# Patient Record
Sex: MALE | Race: OTHER | HISPANIC OR LATINO | URBAN - METROPOLITAN AREA
[De-identification: names, ages, dates, MRNs, and addresses within clinical notes are randomized per-mention and may not be internally consistent; named-entity substitution may affect disease eponyms.]

---

## 2022-09-17 ENCOUNTER — INPATIENT (INPATIENT)
Facility: HOSPITAL | Age: 22
LOS: 3 days | Discharge: HOME | End: 2022-09-21
Attending: STUDENT IN AN ORGANIZED HEALTH CARE EDUCATION/TRAINING PROGRAM | Admitting: STUDENT IN AN ORGANIZED HEALTH CARE EDUCATION/TRAINING PROGRAM

## 2022-09-17 VITALS
TEMPERATURE: 97 F | RESPIRATION RATE: 16 BRPM | HEART RATE: 89 BPM | SYSTOLIC BLOOD PRESSURE: 139 MMHG | DIASTOLIC BLOOD PRESSURE: 82 MMHG | OXYGEN SATURATION: 99 %

## 2022-09-17 PROCEDURE — 99285 EMERGENCY DEPT VISIT HI MDM: CPT

## 2022-09-17 RX ORDER — SODIUM CHLORIDE 9 MG/ML
1000 INJECTION, SOLUTION INTRAVENOUS ONCE
Refills: 0 | Status: COMPLETED | OUTPATIENT
Start: 2022-09-17 | End: 2022-09-17

## 2022-09-17 RX ORDER — ONDANSETRON 8 MG/1
4 TABLET, FILM COATED ORAL ONCE
Refills: 0 | Status: COMPLETED | OUTPATIENT
Start: 2022-09-17 | End: 2022-09-17

## 2022-09-17 RX ADMIN — ONDANSETRON 4 MILLIGRAM(S): 8 TABLET, FILM COATED ORAL at 23:56

## 2022-09-17 RX ADMIN — SODIUM CHLORIDE 1000 MILLILITER(S): 9 INJECTION, SOLUTION INTRAVENOUS at 23:56

## 2022-09-17 NOTE — ED PROVIDER NOTE - CLINICAL SUMMARY MEDICAL DECISION MAKING FREE TEXT BOX
22-year-old man presents here for an episode of seizures.  Patient states he was laying in bed and woke up suddenly had a tonic-clonic seizure lasted 5 to 10 minutes associated with urinary incontinence no tongue biting was postictal for about 20 minutes never had this before endorses drinking alcohol 1-2 times a week endorses nausea vomiting and epigastric pain after nausea vomiting no chest pain shortness of breath numbness tingling neck pain  vs reviewed labs imaging ekg obtained and reviewed, neuro consulted, recommended admission to [facility] for eeg monitoring.

## 2022-09-17 NOTE — ED ADULT TRIAGE NOTE - CHIEF COMPLAINT QUOTE
Biba due to brother found shaking on ground for unknown time. Pt is awake and alert in triage.  on scene.

## 2022-09-17 NOTE — ED PROVIDER NOTE - NS ED ROS FT
CONSTITUTIONAL - No acute distress , No weight change  SKIN - No rash  HEMATOLOGIC - No abnormal bleeding or bruising  EYES - , No conjunctival injection, No drainage   ENT -, No sore throat, No neck pain, No rhinorrhea, No ear pain  RESPIRATORY - No shortness of breath, No cough  CARDIAC -No chest pain, No palpitations  GI - No abdominal pain, + nausea, vomiting, No diarrhea, No constipation, No bright red blood per rectum or melena. No flank pain  - No dysuria, frequency, hematuria  MUSCULOSKELETAL - No joint paint, No swelling, No back pain  NEUROLOGIC - No numbness, No focal weakness, + headache, No dizziness, +seizure  ALLERGIC- no pruritis

## 2022-09-17 NOTE — ED PROVIDER NOTE - ATTENDING CONTRIBUTION TO CARE
22-year-old man presents here for an episode of seizures.  Patient states he was laying in bed and woke up suddenly had a tonic-clonic seizure lasted 5 to 10 minutes associated with urinary incontinence no tongue biting was postictal for about 20 minutes never had this before endorses drinking alcohol 1-2 times a week endorses nausea vomiting and epigastric pain after nausea vomiting no chest pain shortness of breath numbness tingling neck pain   CONSTITUTIONAL: WA / WN / NAD  HEAD: NCAT  EYES: PERRL; EOMI;   ENT: Normal pharynx; mucous membranes pink/moist, no erythema.  NECK: Supple; no meningeal signs  CARD: RRR; nl S1/S2; no M/R/G. P  RESP: Respiratory rate and effort are normal; breath sounds clear and equal bilaterally.  ABD: Soft, ND +epigastric ttp  MSK/EXT: No gross deformities; full range of motion.  SKIN: Warm and dry;   NEURO: AAOx3, Motor 5/5 x 4 extremities,   PSYCH: Memory Intact, Normal Affect 22-year-old man presents here for an episode of seizures.  Patient states he was laying in bed and woke up suddenly had a tonic-clonic seizure lasted 5 to 10 minutes associated with urinary incontinence no tongue biting was postictal for about 20 minutes never had this before endorses drinking alcohol 1-2 times a week endorses nausea vomiting and epigastric pain after nausea vomiting no chest pain shortness of breath numbness tingling neck pain   CONSTITUTIONAL: WA / WN / NAD  HEAD: NCAT  EYES: PERRL; EOMI;   ENT: Normal pharynx; mucous membranes pink/moist, no erythema.  NECK: Supple; no meningeal signs  CARD: RRR; nl S1/S2; no M/R/G.   RESP: Respiratory rate and effort are normal; breath sounds clear and equal bilaterally.  ABD: Soft, ND +epigastric ttp  MSK/EXT: No gross deformities; full range of motion.  SKIN: Warm and dry;   NEURO: AAOx3, Motor 5/5 x 4 extremities,   PSYCH: Memory Intact, Normal Affect

## 2022-09-17 NOTE — ED PROVIDER NOTE - PROGRESS NOTE DETAILS
Neurology consulted, will come to evaluate patient shortly; PT says he is feeling well, mild HA but nausea has resolved -CD PT reassessed, stable, resting comfortably, awaiting neuro -CD Neuro contacted again, resident discussing with attending currently, will get back to ED team with dispo -CD PT reassessed, stable, resting comfortably, awaiting neuro; neuro contacted again and awaiting attending response -CD

## 2022-09-17 NOTE — ED PROVIDER NOTE - OBJECTIVE STATEMENT
PT is a 22M with no PMH aside from HT 16 years ago with +skull fracture, no neurointervention p/w first time seizure. Around 1030PM tonight, PT was in bed, brother nearby, when PT began shaking with generalized movements, eyes rolled in the back of his head, +incontinence. Episode lasted about 5-15 minutes, unsure of time frame, before breaking on its own. Afterwards, PT confused for about 20 minutes. +nausea with one episode of NBNB vomiting. +mild HA. PT was well leading up to incidence, has never suffered from a seizure before. Denies fever, is otherwise well. PT is a 22M with no PMH aside from HT 16 years ago with +skull fracture, no neurointervention p/w first time seizure. Around 1030PM tonight, PT was in bed, brother nearby, when PT began shaking with generalized movements, eyes rolled in the back of his head, +incontinence. Episode lasted about 5-15 minutes, unsure of time frame, before breaking on its own. Afterwards, PT confused for about 20 minutes. +nausea with one episode of NBNB vomiting. +mild HA. PT was well leading up to incidence, has never suffered from a seizure before. Denies fever, is otherwise well. Denies drug or alcohol use, recent HT

## 2022-09-17 NOTE — ED PROVIDER NOTE - PHYSICAL EXAMINATION
CONSTITUTIONAL: Well-developed; well-nourished; NAD  SKIN: warm, dry, w/o rash  HEAD: NCAT  EYES: PERRLA, EOMI, no conjunctival injection  ENT: No nasal discharge; nl OP without erythema or exudates  NECK: Supple, non-tender  CARD: nl S1, S2; RRR, no MRG, no JVD  RESP: CTAB, normal respiratory effort  ABD: BS+, soft, NTND, no HSM  EXT: Normal ROM.  No clubbing, cyanosis or edema  NEURO: Alert, oriented, grossly unremarkable; CN2-12 intact; nl cerebellar exam; nl sensation and motor throughout  PSYCH: Cooperative, appropriate

## 2022-09-17 NOTE — ED ADULT NURSE NOTE - OBJECTIVE STATEMENT
pt is a 23 yo M BIBA s/p witnessed seizure. pt brother witnessed the seizure and is unsure of the amount of time the pt was seizing.

## 2022-09-18 LAB
ALBUMIN SERPL ELPH-MCNC: 4.8 G/DL — SIGNIFICANT CHANGE UP (ref 3.5–5.2)
ALP SERPL-CCNC: 138 U/L — HIGH (ref 30–115)
ALT FLD-CCNC: 26 U/L — SIGNIFICANT CHANGE UP (ref 0–41)
ANION GAP SERPL CALC-SCNC: 10 MMOL/L — SIGNIFICANT CHANGE UP (ref 7–14)
APPEARANCE UR: CLEAR — SIGNIFICANT CHANGE UP
AST SERPL-CCNC: 22 U/L — SIGNIFICANT CHANGE UP (ref 0–41)
BACTERIA # UR AUTO: ABNORMAL
BASOPHILS # BLD AUTO: 0.06 K/UL — SIGNIFICANT CHANGE UP (ref 0–0.2)
BASOPHILS NFR BLD AUTO: 0.5 % — SIGNIFICANT CHANGE UP (ref 0–1)
BILIRUB SERPL-MCNC: 0.3 MG/DL — SIGNIFICANT CHANGE UP (ref 0.2–1.2)
BILIRUB UR-MCNC: NEGATIVE — SIGNIFICANT CHANGE UP
BUN SERPL-MCNC: 11 MG/DL — SIGNIFICANT CHANGE UP (ref 10–20)
CALCIUM SERPL-MCNC: 9.8 MG/DL — SIGNIFICANT CHANGE UP (ref 8.4–10.5)
CHLORIDE SERPL-SCNC: 96 MMOL/L — LOW (ref 98–110)
CO2 SERPL-SCNC: 25 MMOL/L — SIGNIFICANT CHANGE UP (ref 17–32)
COLOR SPEC: YELLOW — SIGNIFICANT CHANGE UP
CREAT SERPL-MCNC: 0.7 MG/DL — SIGNIFICANT CHANGE UP (ref 0.7–1.5)
DIFF PNL FLD: ABNORMAL
EGFR: 133 ML/MIN/1.73M2 — SIGNIFICANT CHANGE UP
EOSINOPHIL # BLD AUTO: 0.34 K/UL — SIGNIFICANT CHANGE UP (ref 0–0.7)
EOSINOPHIL NFR BLD AUTO: 3 % — SIGNIFICANT CHANGE UP (ref 0–8)
GLUCOSE SERPL-MCNC: 153 MG/DL — HIGH (ref 70–99)
GLUCOSE UR QL: NEGATIVE MG/DL — SIGNIFICANT CHANGE UP
HCT VFR BLD CALC: 47.6 % — SIGNIFICANT CHANGE UP (ref 42–52)
HGB BLD-MCNC: 16.7 G/DL — SIGNIFICANT CHANGE UP (ref 14–18)
IMM GRANULOCYTES NFR BLD AUTO: 0.4 % — HIGH (ref 0.1–0.3)
KETONES UR-MCNC: NEGATIVE — SIGNIFICANT CHANGE UP
LACTATE SERPL-SCNC: 3.1 MMOL/L — HIGH (ref 0.7–2)
LEUKOCYTE ESTERASE UR-ACNC: ABNORMAL
LYMPHOCYTES # BLD AUTO: 3.63 K/UL — HIGH (ref 1.2–3.4)
LYMPHOCYTES # BLD AUTO: 32.4 % — SIGNIFICANT CHANGE UP (ref 20.5–51.1)
MCHC RBC-ENTMCNC: 31.9 PG — HIGH (ref 27–31)
MCHC RBC-ENTMCNC: 35.1 G/DL — SIGNIFICANT CHANGE UP (ref 32–37)
MCV RBC AUTO: 90.8 FL — SIGNIFICANT CHANGE UP (ref 80–94)
MONOCYTES # BLD AUTO: 0.76 K/UL — HIGH (ref 0.1–0.6)
MONOCYTES NFR BLD AUTO: 6.8 % — SIGNIFICANT CHANGE UP (ref 1.7–9.3)
NEUTROPHILS # BLD AUTO: 6.36 K/UL — SIGNIFICANT CHANGE UP (ref 1.4–6.5)
NEUTROPHILS NFR BLD AUTO: 56.9 % — SIGNIFICANT CHANGE UP (ref 42.2–75.2)
NITRITE UR-MCNC: NEGATIVE — SIGNIFICANT CHANGE UP
NRBC # BLD: 0 /100 WBCS — SIGNIFICANT CHANGE UP (ref 0–0)
PH UR: 6.5 — SIGNIFICANT CHANGE UP (ref 5–8)
PLATELET # BLD AUTO: 347 K/UL — SIGNIFICANT CHANGE UP (ref 130–400)
POTASSIUM SERPL-MCNC: 3.9 MMOL/L — SIGNIFICANT CHANGE UP (ref 3.5–5)
POTASSIUM SERPL-SCNC: 3.9 MMOL/L — SIGNIFICANT CHANGE UP (ref 3.5–5)
PROT SERPL-MCNC: 7.2 G/DL — SIGNIFICANT CHANGE UP (ref 6–8)
PROT UR-MCNC: NEGATIVE MG/DL — SIGNIFICANT CHANGE UP
RBC # BLD: 5.24 M/UL — SIGNIFICANT CHANGE UP (ref 4.7–6.1)
RBC # FLD: 12.5 % — SIGNIFICANT CHANGE UP (ref 11.5–14.5)
RBC CASTS # UR COMP ASSIST: SIGNIFICANT CHANGE UP /HPF
SARS-COV-2 RNA SPEC QL NAA+PROBE: SIGNIFICANT CHANGE UP
SODIUM SERPL-SCNC: 131 MMOL/L — LOW (ref 135–146)
SP GR SPEC: 1.02 — SIGNIFICANT CHANGE UP (ref 1.01–1.03)
UROBILINOGEN FLD QL: 0.2 MG/DL — SIGNIFICANT CHANGE UP
WBC # BLD: 11.19 K/UL — HIGH (ref 4.8–10.8)
WBC # FLD AUTO: 11.19 K/UL — HIGH (ref 4.8–10.8)
WBC UR QL: ABNORMAL /HPF

## 2022-09-18 PROCEDURE — 99221 1ST HOSP IP/OBS SF/LOW 40: CPT

## 2022-09-18 PROCEDURE — 70450 CT HEAD/BRAIN W/O DYE: CPT | Mod: 26,MA

## 2022-09-18 PROCEDURE — 99223 1ST HOSP IP/OBS HIGH 75: CPT

## 2022-09-18 PROCEDURE — 93010 ELECTROCARDIOGRAM REPORT: CPT

## 2022-09-18 RX ORDER — IBUPROFEN 200 MG
800 TABLET ORAL ONCE
Refills: 0 | Status: COMPLETED | OUTPATIENT
Start: 2022-09-18 | End: 2022-09-18

## 2022-09-18 RX ORDER — ENOXAPARIN SODIUM 100 MG/ML
40 INJECTION SUBCUTANEOUS EVERY 24 HOURS
Refills: 0 | Status: DISCONTINUED | OUTPATIENT
Start: 2022-09-18 | End: 2022-09-21

## 2022-09-18 RX ORDER — SODIUM CHLORIDE 9 MG/ML
1000 INJECTION, SOLUTION INTRAVENOUS ONCE
Refills: 0 | Status: COMPLETED | OUTPATIENT
Start: 2022-09-18 | End: 2022-09-18

## 2022-09-18 RX ADMIN — SODIUM CHLORIDE 1000 MILLILITER(S): 9 INJECTION, SOLUTION INTRAVENOUS at 01:56

## 2022-09-18 RX ADMIN — Medication 800 MILLIGRAM(S): at 01:02

## 2022-09-18 RX ADMIN — Medication 800 MILLIGRAM(S): at 01:56

## 2022-09-18 NOTE — PATIENT PROFILE ADULT - FALL HARM RISK - HARM RISK INTERVENTIONS

## 2022-09-18 NOTE — CONSULT NOTE ADULT - PROVIDER SPECIALTY LIST ADULT
Cindy Honeycutt is requesting a refill of lidocaine viscous for a 30 day supply and would like sent to local pharmacy, [facility].    Neurology 0 = swallows foods/liquids without difficulty

## 2022-09-18 NOTE — H&P ADULT - ASSESSMENT
New Onset Seizure in a 23yo M w/ PMHx of Head / Skull trauma 16yrs ago being transfered to [facility] for admission to epilepsy unit for further workup and monitoring    Impression  New Onset GTC  Reactive - elevated WBC, Lactic Acid    Neuro recs appreciated:  Get UA, Utox, BAL  Get A1c  Needs Continuous EEG monitoring >> Admit to [facility]  Lorazepam 2mg IVP for Seizure > 2 mins   Seizure / Fall risk Precaution  May Need to start ASM pending EMU admission

## 2022-09-18 NOTE — CONSULT NOTE ADULT - ATTENDING COMMENTS
I have personally seen and examined this patient.  I have fully participated in the care of this patient.  I have reviewed all pertinent clinical information, including history, physical exam, plan and note.  23yo man with history of head trauma many years ago presented with episode of suspected seizure upon awakening and one more episode LOC few months ago. [facility].      I have reviewed all pertinent clinical information and reviewed all relevant imaging and diagnostic studies personally.  Recommendations as above.  Agree with above assessment except as noted. I have personally seen and examined this patient.  I have fully participated in the care of this patient.  I have reviewed all pertinent clinical information, including history, physical exam, plan and note.  21yo man with history of head trauma many years ago presented with episode of suspected seizure upon awakening and one more episode LOC few months ago. CT head showed chronic right frontal infarct rises suspicion for localized related epilepsy. Recommend [facility] for [facility]G.      I have reviewed all pertinent clinical information and reviewed all relevant imaging and diagnostic studies personally.  Recommendations as above.  Agree with above assessment except as noted.

## 2022-09-18 NOTE — H&P ADULT - HISTORY OF PRESENT ILLNESS
22y Male w/ No PMHx aside from Head Trauma 16 years ago with +skull fracture, no neuro-intervention p/w first time seizure. Around 1030PM 09/17 brother noticed Pt shaking in bed. Brother describe him w/ neck hyperextended, eyes rolled back, foaming of the mouth, b/l elbow flexion, extended legs w/ body shaking. Brother expressed that event lasted for what felt like 7 mins. After the event ended he would called his brother name w/out response other than a eye opening then closed. Pt regain consciousness when ambulance arrived. Pt also had post ictal urinary incontinence.  Pt was also nauseated and had one episode of NBNB vomiting and a mild HA.     Pt reported a h/o trying to wake up but unable to 3 months ago which felt similar without the shaking. He denies h/o seizure, recent head trauma, recent illness or drug use. He currently drinks 7-8beers occasionally but had 2 beers yesterday.     Admitting team contacted by ED for stat admission as transport is waiting to take patient to [facility] epilepsy unit     Epilepsy risk factors: Head trauma w/ skull fracture   Head injury with subsequent LOC?: No  Febrile seizures in infancy?: None   Hx of CNS infection?: No   Family hx of epilepsy?: No  Known CNS pathology?: Trauma      Prior AEDs: None    Prior imaging CTH - se below   Prior EEGs - None

## 2022-09-18 NOTE — H&P ADULT - NSHPLABSRESULTS_GEN_ALL_CORE
131<L>  |  96<L>  |  11  ----------------------------<  153<H>  3.9   |  25  |  0.7    Ca    9.8      18 Sep 2022 00:00  Mg     2.0     09-18    TPro  7.2  /  Alb  4.8  /  TBili  0.3  /  DBili  x   /  AST  22  /  ALT  26  /  AlkPhos  138<H>  09-18    LIVER FUNCTIONS - ( 18 Sep 2022 00:00 )  Alb: 4.8 g/dL / Pro: 7.2 g/dL / ALK PHOS: 138 U/L / ALT: 26 U/L / AST: 22 U/L / GGT: x                 EEG:    < from: CT Head No Cont (09.18.22 @ 00:10) >  Gliotic changes are noted in the inferior right frontal lobe, compatible   with chronic infarct. There is associated mild ex vacuo dilatation of the   anterior horn of the right lateral ventricle. There is a comminuted right   frontal calvarial defect measuring up to 1.7 x 1.5 cm, compatible with   craniotomy versus chronic fracture.    There is no intraparenchymal hematoma, mass effect or midline shift. No   abnormal extra-axial fluid collections are present.    The visualized intraorbital compartments, paranasal sinuses and mastoid   complexes appear free of acute disease.    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    No acute intracranial pathology.

## 2022-09-18 NOTE — H&P ADULT - NSHPPHYSICALEXAM_GEN_ALL_CORE
General:  Constitutional:  Sitting comfortably in NAD.  Psychiatric: calm, normal affect, no overt anxiety or internal preoccupation  Ears, Nose, Throat: no abnormalities, mucus membranes moist  Neck: supple, no lymphadenopathy or nodules palpable  Cardiovascular: regular rate and rhythm, normal S1/S2, no murmurs   Chest: Clear to bases. 	  Abdomen: soft, non-tender, no hepatosplenomegaly   Extremities: no edema, clubbing or cyanosis  Skin: no rash or neurocutaneous signs     Cognitive:  Orientation, language, memory and knowledge screens intact.  Cranial Nerves:  II: Full to confrontation. III/IV/VI: PERRL EOMI No nystagmus  V1V2V3: Symmetric, VII: Face appears symmetric VIII: Normal to screening, IX/X: Palate Elevates Symmetrical  XI: Trapezius Symmetric  XII: Tongue midline  Motor:  Power: 5/5 throughout, tone: normal x 4 limbs, no tremor   Sensation:  Intact to light touch. Intact to pinprick/temperature and vibration.  Coordination/Gait:  Finger-nose-finger intact, normal rapid-alternating movements.  Fine motor normal with normal rapid finger taps and heel-toe tapping   narrow based gait, tandem forward and back ok  hops well on both feet, heel and toe walking normal   Reflexes:  DTR: 2+ symmetric all 4 limbs, no clonus  Plantar responses: Down bilaterally

## 2022-09-18 NOTE — CONSULT NOTE ADULT - ASSESSMENT
Neurology Consulted for New Onset Seizure in a 21yo M w/ PMHx of Head / Skull trauma 16yrs ago.     Impression  New Onset GTC  Reactive - elevated WBC, LActic Acid    Recommendations:  Get UA, Utox, BAL  Get A1c  Needs EEG monitoring  Lorazepam 2mg IVP for Seizure > 2 mins   Seizure / Fall risk Precaution  May Need to start ASM    Thank you for the courtesy of this consult, Please contact us if any neurological changes or questions, neurology team will continue to follow.    Discussing w/ Attending Neurology Consulted for New Onset Seizure in a 21yo M w/ PMHx of Head / Skull trauma 16yrs ago.     Impression  New Onset GTC  Reactive - elevated WBC, LActic Acid    Recommendations:  Get UA, Utox, BAL  Get A1c  Needs Continuous EEG monitoring >> Admit to [facility] EMU  Lorazepam 2mg IVP for Seizure > 2 mins   Seizure / Fall risk Precaution  May Need to start ASM pending EMU admission    Thank you for the courtesy of this consult, Please contact us if any neurological changes or questions, neurology team will continue to follow.     Neurology Consulted for New Onset Seizure in a 23yo M w/ PMHx of Head / Skull trauma 16yrs ago.     Impression  New Onset GTC  Reactive - elevated WBC, LActic Acid    Recommendations:  Get UA, Utox, BAL  Get A1c  Needs Continuous EEG monitoring >> Admit to [facility] EMU  Lorazepam 2mg IVP for Seizure > 2 mins   Seizure / Fall risk Precaution    Thank you for the courtesy of this consult, Please contact us if any neurological changes or questions, neurology team will continue to follow.

## 2022-09-18 NOTE — CONSULT NOTE ADULT - SUBJECTIVE AND OBJECTIVE BOX
HPI  22y Male w/ No PMHx aside from Head Trauma 16 years ago with +skull fracture, no neuro-intervention p/w first time seizure. Around 1030PM 09/17 brother noticed Pt shaking in bed. Brother describe him w/ neck hyperextended, eyes rolled back, foaming of the mouth, b/l elbow flexion, extended legs w/ body shaking. Brother expressed that event lasted for what felt like 7 mins. After the event ended he would called his brother name w/out response other than a eye opening then closed. Pt regain consciousness when ambulance arrived. Pt also had post ictal urinary incontinence.  Pt was also nauseated and had one episode of NBNB vomiting and a mild HA.     Pt reported a h/o trying to wake up but unable to 3 months ago which felt similar without the shaking. He denies h/o seizure, recent head trauma, recent illness or durug use. He currently drinks 7-8beers occasionally but had 2 beers yesterday.      Epilepsy risk factors: Head trauma w/ skull fracture   Head injury with subsequent LOC?: No  Febrile seizures in infancy?: None   Hx of CNS infection?: No   Family hx of epilepsy?: No  Known CNS pathology?: Trauma      Prior AEDs: None    Prior imaging CTH - se below   Prior EEGs - None      Other diagnostic work-up     Review of Systems:  CONSTITUTIONAL:  No weight loss, fever, chills, weakness or fatigue.  HEENT:  Eyes:  No visual loss, blurred vision, double vision or yellow sclerae. Ears, Nose, Throat:  No hearing loss, sneezing, congestion, runny nose or sore throat.  SKIN:  No rash or itching.  CARDIOVASCULAR:  No chest pain, chest pressure or chest discomfort. No palpitations or edema.  RESPIRATORY:  No shortness of breath, cough or sputum.  GASTROINTESTINAL:  No anorexia, nausea, vomiting or diarrhea. No abdominal pain or blood.  GENITOURINARY: No Burning on urination.   NEUROLOGICAL:  see HPI  MUSCULOSKELETAL:  No muscle, back pain, joint pain or stiffness.  HEMATOLOGIC:  No anemia, bleeding or bruising.  LYMPHATICS:  No enlarged nodes. No history of splenectomy.  PSYCHIATRIC:  No history of depression or anxiety.  ENDOCRINOLOGIC:  No reports of sweating, cold or heat intolerance. No polyuria or polydipsia.  ALLERGIES:  No history of asthma, hives, eczema or rhinitis.       PAST MEDICAL & SURGICAL HISTORY:  Seizure  No significant past surgical history     FAMILY HISTORY:       Social History:  Alcohol, illicits, smoking?: 7-8 beers occasionally   Driving?:  Occupation:       Allergies  No Known Allergies     MEDICATIONS  (STANDING): None    MEDICATIONS  (PRN):       T(C): 36.2 (09-17-22 @ 22:58), Max: 36.2 (09-17-22 @ 22:58)  HR: 89 (09-17-22 @ 22:58) (89 - 89)  BP: 139/82 (09-17-22 @ 22:58) (139/82 - 139/82)  RR: 16 (09-17-22 @ 22:58) (16 - 16)  SpO2: 99% (09-17-22 @ 22:58) (99% - 99%)  Wt(kg): --    General:  Constitutional:  Sitting comfortably in NAD.  Psychiatric: calm, normal affect, no overt anxiety or internal preoccupation  Ears, Nose, Throat: no abnormalities, mucus membranes moist  Neck: supple, no lymphadenopathy or nodules palpable  Cardiovascular: regular rate and rhythm, normal S1/S2, no murmurs   Chest: Clear to bases. 	  Abdomen: soft, non-tender, no hepatosplenomegaly   Extremities: no edema, clubbing or cyanosis  Skin: no rash or neurocutaneous signs     Cognitive:  Orientation, language, memory and knowledge screens intact.  Cranial Nerves:  II: Full to confrontation. III/IV/VI: PERRL EOMI No nystagmus  V1V2V3: Symmetric, VII: Face appears symmetric VIII: Normal to screening, IX/X: Palate Elevates Symmetrical  XI: Trapezius Symmetric  XII: Tongue midline  Motor:  Power: 5/5 throughout, tone: normal x 4 limbs, no tremor   Sensation:  Intact to light touch. Intact to pinprick/temperature and vibration.  Coordination/Gait:  Finger-nose-finger intact, normal rapid-alternating movements.  Fine motor normal with normal rapid finger taps and heel-toe tapping   narrow based gait, tandem forward and back ok  hops well on both feet, heel and toe walking normal   Reflexes:  DTR: 2+ symmetric all 4 limbs, no clonus  Plantar responses: Down bilaterally         Labs  CBC Full  -  ( 18 Sep 2022 00:00 )  WBC Count : 11.19 K/uL  RBC Count : 5.24 M/uL  Hemoglobin : 16.7 g/dL  Hematocrit : 47.6 %  Platelet Count - Automated : 347 K/uL  Mean Cell Volume : 90.8 fL  Mean Cell Hemoglobin : 31.9 pg  Mean Cell Hemoglobin Concentration : 35.1 g/dL  Auto Neutrophil # : 6.36 K/uL  Auto Lymphocyte # : 3.63 K/uL  Auto Monocyte # : 0.76 K/uL  Auto Eosinophil # : 0.34 K/uL  Auto Basophil # : 0.06 K/uL  Auto Neutrophil % : 56.9 %  Auto Lymphocyte % : 32.4 %  Auto Monocyte % : 6.8 %  Auto Eosinophil % : 3.0 %  Auto Basophil % : 0.5 %    09-18    131<L>  |  96<L>  |  11  ----------------------------<  153<H>  3.9   |  25  |  0.7    Ca    9.8      18 Sep 2022 00:00  Mg     2.0     09-18    TPro  7.2  /  Alb  4.8  /  TBili  0.3  /  DBili  x   /  AST  22  /  ALT  26  /  AlkPhos  138<H>  09-18    LIVER FUNCTIONS - ( 18 Sep 2022 00:00 )  Alb: 4.8 g/dL / Pro: 7.2 g/dL / ALK PHOS: 138 U/L / ALT: 26 U/L / AST: 22 U/L / GGT: x                 EEG:    < from: CT Head No Cont (09.18.22 @ 00:10) >  Gliotic changes are noted in the inferior right frontal lobe, compatible   with chronic infarct. There is associated mild ex vacuo dilatation of the   anterior horn of the right lateral ventricle. There is a comminuted right   frontal calvarial defect measuring up to 1.7 x 1.5 cm, compatible with   craniotomy versus chronic fracture.    There is no intraparenchymal hematoma, mass effect or midline shift. No   abnormal extra-axial fluid collections are present.    The visualized intraorbital compartments, paranasal sinuses and mastoid   complexes appear free of acute disease.    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    No acute intracranial pathology.    < end of copied text >

## 2022-09-18 NOTE — CHART NOTE - NSCHARTNOTEFT_GEN_A_CORE
Pt seen and examined. Pt evaluated by overnight nocturnist. Admitted for suspected first-time seizure. Pt seen and examined. Pt evaluated by overnight nocturnist. Admitted for suspected first-time seizure. Pending transfer to [facility] for vEEG

## 2022-09-18 NOTE — H&P ADULT - ATTENDING COMMENTS
Patient seen and examined at bedside independently of the residents. I read the resident's note and agree with the plan with the additions and corrections as noted below.    REVIEW OF SYSTEMS:  CONSTITUTIONAL: No weakness, fevers or chills  EYES/ENT: No visual changes;  No vertigo or throat pain   NECK: No pain or stiffness  RESPIRATORY: No cough, wheezing, hemoptysis; No shortness of breath  CARDIOVASCULAR: No chest pain or palpitations  GASTROINTESTINAL: No abdominal or epigastric pain. No nausea, vomiting, or hematemesis; No diarrhea or constipation. No melena or hematochezia.  GENITOURINARY: No dysuria, frequency or hematuria  NEUROLOGICAL: No numbness or weakness  SKIN: No itching, rashes.     PMH: Head trauma    FHx: Reviewed. Not relevant.     Physical Exam:  GEN: No acute distress. A & O x 3.   HEENT: PEERLA. No sclera icterus. EOMI.   LUNGS: Clear to auscultation bilaterally. No wheeze/rales/crackles.   HEART: Normal. S1/S2 present. RRR. No murmur/gallops.   ABD: Soft, non-tender, non-distended. Bowel sounds present.   EXT: No pitting edema.   Integumentary: No rash, No petechia.   NEURO: CN III-XII intact. Strength: 5/5 b/l ULE. Sensory intact b/l ULE.     Vital Signs Last 24 Hrs  T(C): 36.2 (17 Sep 2022 22:58), Max: 36.2 (17 Sep 2022 22:58)  T(F): 97.1 (17 Sep 2022 22:58), Max: 97.1 (17 Sep 2022 22:58)  HR: 89 (17 Sep 2022 22:58) (89 - 89)  BP: 139/82 (17 Sep 2022 22:58) (139/82 - 139/82)  BP(mean): --  RR: 16 (17 Sep 2022 22:58) (16 - 16)  SpO2: 99% (17 Sep 2022 22:58) (99% - 99%)    Parameters below as of 17 Sep 2022 22:58  Patient On (Oxygen Delivery Method): room air      Please see the above notes for Labs and radiology.     Assessment and Plan:     23 yo M with hx of Head trauma (~16 yrs ago) without neuro intervention as per family, presents to ED for witnessed seizure like activity.     Seizure like activity likely new onset Generalized tonic clonic seizure  - CTH shows negative for acute intracranial pathology. 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  - will r/o infection: check UA, UCx, BAL  - check Utox  - ativan 2mg IV prn for seizure > 2 mins  - will monitor on EEG   - Seizure precaution  - Transfer to [facility] for EMU admission.     Hyperglycemia - check HbA1c. May start insulin if FS persistently > 180.     Alcohol use   - CIWA 0.   - monitor for withdrawal.     DVT ppx: Lovenox SC  GI ppx: not indicated.   Diet: Carb consistent diet  Activity: as tolerated.     Date seen by the attendin2022.

## 2022-09-19 LAB
A1C WITH ESTIMATED AVERAGE GLUCOSE RESULT: 5.7 % — HIGH (ref 4–5.6)
AMPHET UR-MCNC: NEGATIVE — SIGNIFICANT CHANGE UP
ANION GAP SERPL CALC-SCNC: 9 MMOL/L — SIGNIFICANT CHANGE UP (ref 7–14)
BARBITURATES UR SCN-MCNC: NEGATIVE — SIGNIFICANT CHANGE UP
BENZODIAZ UR-MCNC: NEGATIVE — SIGNIFICANT CHANGE UP
BUN SERPL-MCNC: 13 MG/DL — SIGNIFICANT CHANGE UP (ref 10–20)
CALCIUM SERPL-MCNC: 10.6 MG/DL — HIGH (ref 8.4–10.5)
CHLORIDE SERPL-SCNC: 102 MMOL/L — SIGNIFICANT CHANGE UP (ref 98–110)
CO2 SERPL-SCNC: 28 MMOL/L — SIGNIFICANT CHANGE UP (ref 17–32)
COCAINE METAB.OTHER UR-MCNC: NEGATIVE — SIGNIFICANT CHANGE UP
CREAT SERPL-MCNC: 0.7 MG/DL — SIGNIFICANT CHANGE UP (ref 0.7–1.5)
EGFR: 133 ML/MIN/1.73M2 — SIGNIFICANT CHANGE UP
ESTIMATED AVERAGE GLUCOSE: 117 MG/DL — HIGH (ref 68–114)
GLUCOSE SERPL-MCNC: 101 MG/DL — HIGH (ref 70–99)
HCT VFR BLD CALC: 49.1 % — SIGNIFICANT CHANGE UP (ref 42–52)
HGB BLD-MCNC: 16.4 G/DL — SIGNIFICANT CHANGE UP (ref 14–18)
MAGNESIUM SERPL-MCNC: 2.2 MG/DL — SIGNIFICANT CHANGE UP (ref 1.8–2.4)
MCHC RBC-ENTMCNC: 30.9 PG — SIGNIFICANT CHANGE UP (ref 27–31)
MCHC RBC-ENTMCNC: 33.4 G/DL — SIGNIFICANT CHANGE UP (ref 32–37)
MCV RBC AUTO: 92.5 FL — SIGNIFICANT CHANGE UP (ref 80–94)
METHADONE UR-MCNC: NEGATIVE — SIGNIFICANT CHANGE UP
NRBC # BLD: 0 /100 WBCS — SIGNIFICANT CHANGE UP (ref 0–0)
OPIATES UR-MCNC: NEGATIVE — SIGNIFICANT CHANGE UP
PCP SPEC-MCNC: SIGNIFICANT CHANGE UP
PLATELET # BLD AUTO: 328 K/UL — SIGNIFICANT CHANGE UP (ref 130–400)
POTASSIUM SERPL-MCNC: 4.7 MMOL/L — SIGNIFICANT CHANGE UP (ref 3.5–5)
POTASSIUM SERPL-SCNC: 4.7 MMOL/L — SIGNIFICANT CHANGE UP (ref 3.5–5)
PROPOXYPHENE QUALITATIVE URINE RESULT: NEGATIVE — SIGNIFICANT CHANGE UP
RBC # BLD: 5.31 M/UL — SIGNIFICANT CHANGE UP (ref 4.7–6.1)
RBC # FLD: 12.3 % — SIGNIFICANT CHANGE UP (ref 11.5–14.5)
SODIUM SERPL-SCNC: 139 MMOL/L — SIGNIFICANT CHANGE UP (ref 135–146)
WBC # BLD: 11.43 K/UL — HIGH (ref 4.8–10.8)
WBC # FLD AUTO: 11.43 K/UL — HIGH (ref 4.8–10.8)

## 2022-09-19 PROCEDURE — 99232 SBSQ HOSP IP/OBS MODERATE 35: CPT

## 2022-09-19 RX ORDER — ACETAMINOPHEN 500 MG
650 TABLET ORAL ONCE
Refills: 0 | Status: COMPLETED | OUTPATIENT
Start: 2022-09-19 | End: 2022-09-19

## 2022-09-19 RX ADMIN — ENOXAPARIN SODIUM 40 MILLIGRAM(S): 100 INJECTION SUBCUTANEOUS at 21:12

## 2022-09-19 RX ADMIN — Medication 650 MILLIGRAM(S): at 12:32

## 2022-09-19 NOTE — CHART NOTE - NSCHARTNOTEFT_GEN_A_CORE
Epilepsy NP:    Patient was transferred to EMU last night from ED [place].  No events reported since admission. Not on seizure medications.    Plan:  VEEG monitoring for better characterization and treatment plan (started this morning)  Seizure precautions  Ativan 2mg IV PRN for generalized tonic-clonic seizure lasting longer than 2 minutes, or two consecutive seizures without return to baseline in-between  WIll follow

## 2022-09-20 LAB
CULTURE RESULTS: SIGNIFICANT CHANGE UP
SPECIMEN SOURCE: SIGNIFICANT CHANGE UP

## 2022-09-20 PROCEDURE — 99232 SBSQ HOSP IP/OBS MODERATE 35: CPT

## 2022-09-20 PROCEDURE — 95720 EEG PHY/QHP EA INCR W/VEEG: CPT

## 2022-09-20 RX ORDER — ACETAMINOPHEN 500 MG
650 TABLET ORAL EVERY 4 HOURS
Refills: 0 | Status: DISCONTINUED | OUTPATIENT
Start: 2022-09-20 | End: 2022-09-21

## 2022-09-20 RX ADMIN — Medication 650 MILLIGRAM(S): at 20:13

## 2022-09-20 RX ADMIN — Medication 650 MILLIGRAM(S): at 20:43

## 2022-09-20 RX ADMIN — ENOXAPARIN SODIUM 40 MILLIGRAM(S): 100 INJECTION SUBCUTANEOUS at 21:29

## 2022-09-20 NOTE — EEG REPORT - NS EEG TEXT BOX
VIDEO EEG FINAL REPORT      VALENCIA PADRON    22y Male  MRN [id number]      Recording Technique: The patient underwent continuous video-EEG monitoring using Telemetry System hardware on the "[employer] System. EEG and video data were stored on a computer hard drive with important events saved in digital archive files. The material was reviewed by a physician (electroencephalographer/epileptologist) on a daily basis. Tino and seizure detection algorithms were utilized if needed. An EEG technician attended to the patient for 8 to 10 hours per day. The patient was observed by the epilepsy nursing staff 24 hrs per day. The epilepsy center neurologist was available in person or on call 24 hours per day.    Placement and Labeling of Eelectrodes: The EEG was performed using at least 20 channel referential EEG connections (coronal over temporal over parasaggital montage) with inferior temporal electrodes when indicting and using all standard 10-20 electrode placement with EKG, with additional electrodes placed in the inferior temporal region using the modified 10-10 montage electrode placements for elective admissions, or if deemed necessary. Recording was at a sampling rate of 256 samples per second per channel. Time syncronized digital video recording was done simultaneously with EEG recording. A low light infrared camera was used for low light recording.      HPI:  22y Male w/ No PMHx aside from Head Trauma 16 years ago with +skull fracture, no neuro-intervention p/w first time seizure. Around 1030PM 09/17 brother noticed Pt shaking in bed. Brother describe him w/ neck hyperextended, eyes rolled back, foaming of the mouth, b/l elbow flexion, extended legs w/ body shaking. Brother expressed that event lasted for what felt like 7 mins. After the event ended he would called his brother name w/out response other than a eye opening then closed. Pt regain consciousness when ambulance arrived. Pt also had post ictal urinary incontinence.  Pt was also nauseated and had one episode of NBNB vomiting and a mild HA.     Pt reported a h/o trying to wake up but unable to 3 months ago which felt similar without the shaking. He denies h/o seizure, recent head trauma, recent illness or drug use. He currently drinks 7-8beers occasionally but had 2 beers yesterday.     Admitting team contacted by ED for stat admission as transport is waiting to take patient to [facility] epilepsy unit     Epilepsy risk factors: Head trauma w/ skull fracture   Head injury with subsequent LOC?: No  Febrile seizures in infancy?: None   Hx of CNS infection?: No   Family hx of epilepsy?: No  Known CNS pathology?: Trauma      Prior AEDs: None    Prior imaging CTH - se below   Prior EEGs - None                   (18 Sep 2022 06:17)      MEDICATIONS  (STANDING):  enoxaparin Injectable 40 milliGRAM(s) SubCutaneous every 24 hours    MEDICATIONS  (PRN):  LORazepam   Injectable 2 milliGRAM(s) IV Push three times a day PRN generalized tonic-clonic seizure lasting longer than 2 minutes, or two consecutive seizures without return to baseline in-between        AWAKE  The recording during the wakefulness consists of a symmetrical and well-organized background and posterior dominant rhythm in the range of 8-9 Hz, which is reactive to eye opening and eye closure and change in the level of alertness.      ASLEEP  Different stages of sleep were preserved well. Stage II of non-REM sleep was characterized by the presence of symmetrical and well-defined sleep spindles and vertex sharp waves. The deeper stages of sleep were identified by the presence of low theta and delta range activity seen diffusely over both hemispheres and more prominently from the frontal and central derivations. All stages captured and symmetric.      GENERALIZED SLOWING  None    FOCAL SLOWING    None  BREACH ARTIFACT  None    ACTIVATION PROCEDURES  Hyperventilation: Normal   Photic Stimulation: Not performed.     NONE  SLEEP DEPRIVATION/MEDICATION REDUCTION      EPILEPTIFORM ACTIVITY  None          EVENTS/SEIZURES    None  IMPRESSION  Normal VEEG     CLINICAL CORRELATION  A normal VEEG study does not exclude the clinical diagnosis of epilespy, but no supporting evidence was present on this study.       MD SHANI Canada  Attending, [facility]   Professor, Neurology and Pediatrics, [facility] [employer] [facility] [employer] at [facility].

## 2022-09-21 ENCOUNTER — TRANSCRIPTION ENCOUNTER (OUTPATIENT)
Age: 22
End: 2022-09-21

## 2022-09-21 VITALS
HEART RATE: 52 BPM | TEMPERATURE: 97 F | DIASTOLIC BLOOD PRESSURE: 69 MMHG | SYSTOLIC BLOOD PRESSURE: 110 MMHG | RESPIRATION RATE: 18 BRPM

## 2022-09-21 DIAGNOSIS — R73.03 PREDIABETES: ICD-10-CM

## 2022-09-21 PROBLEM — R56.9 UNSPECIFIED CONVULSIONS: Chronic | Status: ACTIVE | Noted: 2022-09-17

## 2022-09-21 LAB — LACTATE SERPL-SCNC: 1.2 MMOL/L — SIGNIFICANT CHANGE UP (ref 0.7–2)

## 2022-09-21 PROCEDURE — 99239 HOSP IP/OBS DSCHRG MGMT >30: CPT

## 2022-09-21 PROCEDURE — 95720 EEG PHY/QHP EA INCR W/VEEG: CPT

## 2022-09-21 PROCEDURE — 99232 SBSQ HOSP IP/OBS MODERATE 35: CPT

## 2022-09-21 NOTE — DISCHARGE NOTE NURSING/CASE MANAGEMENT/SOCIAL WORK - NSDCPEFALRISK_GEN_ALL_CORE
For information on Fall & Injury Prevention, visit: [email or web address] OR  [email or web address] OR  https://www.cdc.gov/steadi/patient.html

## 2022-09-21 NOTE — EEG REPORT - NS EEG TEXT BOX
· EEG Report	  VIDEO EEG FINAL REPORT      VALENCIA PADRON    22y Male  MRN [id number]      Recording Technique: The patient underwent continuous video-EEG monitoring using Telemetry System hardware on the [employer] System. EEG and video data were stored on a computer hard drive with important events saved in digital archive files. The material was reviewed by a physician (electroencephalographer/epileptologist) on a daily basis. Tino and seizure detection algorithms were utilized if needed. An EEG technician attended to the patient for 8 to 10 hours per day. The patient was observed by the epilepsy nursing staff 24 hrs per day. The epilepsy center neurologist was available in person or on call 24 hours per day.    Placement and Labeling of Eelectrodes: The EEG was performed using at least 20 channel referential EEG connections (coronal over temporal over parasaggital montage) with inferior temporal electrodes when indicting and using all standard 10-20 electrode placement with EKG, with additional electrodes placed in the inferior temporal region using the modified 10-10 montage electrode placements for elective admissions, or if deemed necessary. Recording was at a sampling rate of 256 samples per second per channel. Time syncronized digital video recording was done simultaneously with EEG recording. A low light infrared camera was used for low light recording.      HPI:  22y Male w/ No PMHx aside from Head Trauma 16 years ago with +skull fracture, no neuro-intervention p/w first time seizure. Around 1030PM 09/17 brother noticed Pt shaking in bed. Brother describe him w/ neck hyperextended, eyes rolled back, foaming of the mouth, b/l elbow flexion, extended legs w/ body shaking. Brother expressed that event lasted for what felt like 7 mins. After the event ended he would called his brother name w/out response other than a eye opening then closed. Pt regain consciousness when ambulance arrived. Pt also had post ictal urinary incontinence.  Pt was also nauseated and had one episode of NBNB vomiting and a mild HA.     Pt reported a h/o trying to wake up but unable to 3 months ago which felt similar without the shaking. He denies h/o seizure, recent head trauma, recent illness or drug use. He currently drinks 7-8beers occasionally but had 2 beers yesterday.     Admitting team contacted by ED for stat admission as transport is waiting to take patient to [facility] epilepsy unit     Epilepsy risk factors: Head trauma w/ skull fracture   Head injury with subsequent LOC?: No  Febrile seizures in infancy?: None   Hx of CNS infection?: No   Family hx of epilepsy?: No  Known CNS pathology?: Trauma      Prior AEDs: None    Prior imaging CTH - se below   Prior EEGs - None                   (18 Sep 2022 06:17)      MEDICATIONS  (STANDING):  enoxaparin Injectable 40 milliGRAM(s) SubCutaneous every 24 hours    MEDICATIONS  (PRN):  LORazepam   Injectable 2 milliGRAM(s) IV Push three times a day PRN generalized tonic-clonic seizure lasting longer than 2 minutes, or two consecutive seizures without return to baseline in-between        AWAKE  The recording during the wakefulness consists of a symmetrical and well-organized background and posterior dominant rhythm in the range of 8-9 Hz, which is reactive to eye opening and eye closure and change in the level of alertness.      ASLEEP  Different stages of sleep were preserved well. Stage II of non-REM sleep was characterized by the presence of symmetrical and well-defined sleep spindles and vertex sharp waves. The deeper stages of sleep were identified by the presence of low theta and delta range activity seen diffusely over both hemispheres and more prominently from the frontal and central derivations. All stages captured and symmetric.      GENERALIZED SLOWING  None    FOCAL SLOWING    Rare, mixed theta slowing in the right frontal region  noted in drowsiness and light sleep.   BREACH ARTIFACT  None    ACTIVATION PROCEDURES  Hyperventilation: Normal   Photic Stimulation: Not performed.     NONE  SLEEP DEPRIVATION/MEDICATION REDUCTION      EPILEPTIFORM ACTIVITY  None          EVENTS/SEIZURES    None  IMPRESSION  No events recorded.   Mild right frontal slowing noted in drowsiness and light sleep.     CLINICAL CORRELATION  No supporting evidence of epilepsy  was present on this study. The mild right frontal slowing is consistent with the patient's history of right frontal encephalomalacia.       MD SHANI Canada  Attending, [facility]   Professor, Neurology and Pediatrics, [facility] [employer] at [facility].

## 2022-09-21 NOTE — PROGRESS NOTE ADULT - SUBJECTIVE AND OBJECTIVE BOX
Epilepsy NP Note:    VEEG monitoring completed, patient is clear for discharge from neurology standpoint.    Follow up appointment is scheduled for patient at [facility]  for November 29 at 1 pm.    [address] [place] [address]  [phone]    No seizure medications.    No driving/operating machinery x 1 year as per [facility] law.    Detailed instructions regarding follow up plan are given to the patient, all questions answered.    Discussed with hospitalist and PA.    
PROGRESS NOTE:   Armin Quiros MD  Available on teams    Patient is a 22y old  Male who presents with a chief complaint of seizures    SUBJECTIVE / OVERNIGHT EVENTS:  Reports no new complaints  Denies fever, chills, fatigue, chest pain, shortness of breath, abdominal pain, nausea/ vomiting or diarrhea      MEDICATIONS  (STANDING):  enoxaparin Injectable 40 milliGRAM(s) SubCutaneous every 24 hours    MEDICATIONS  (PRN):  LORazepam   Injectable 2 milliGRAM(s) IV Push three times a day PRN generalized tonic-clonic seizure lasting longer than 2 minutes, or two consecutive seizures without return to baseline in-between      CAPILLARY BLOOD GLUCOSE        I&O's Summary      PHYSICAL EXAM:  Vital Signs Last 24 Hrs  T(C): 36.1 (19 Sep 2022 05:27), Max: 37.6 (18 Sep 2022 20:44)  T(F): 97 (19 Sep 2022 05:27), Max: 99.7 (18 Sep 2022 20:44)  HR: 60 (19 Sep 2022 05:27) (55 - 69)  BP: 111/64 (19 Sep 2022 05:27) (111/64 - 130/87)  BP(mean): --  RR: 16 (19 Sep 2022 05:27) (16 - 16)  SpO2: --      GENERAL: No acute distress, well-developed  HEAD:  Atraumatic, Normocephalic  EYES: EOMI, PERRLA, conjunctiva and sclera clear  NECK: Supple, no lymphadenopathy, no JVD  CHEST/LUNG: CTAB; No wheezes, rales, or rhonchi  HEART: Regular rate and rhythm; No murmurs, rubs, or gallops  ABDOMEN: Soft, non-tender, non-distended; normal bowel sounds, no organomegaly  EXTREMITIES:  2+ peripheral pulses b/l, No clubbing, cyanosis, or edema  NEUROLOGY: A&O x 3, no focal deficits  SKIN: No rashes or lesions    LABS:                        16.4   11.43 )-----------( 328      ( 19 Sep 2022 08:01 )             49.1         139  |  102  |  13  ----------------------------<  101<H>  4.7   |  28  |  0.7    Ca    10.6<H>      19 Sep 2022 08:01  Mg     2.2         TPro  7.2  /  Alb  4.8  /  TBili  0.3  /  DBili  x   /  AST  22  /  ALT  26  /  AlkPhos  138<H>            Urinalysis Basic - ( 18 Sep 2022 22:10 )    Color: Yellow / Appearance: Clear / S.025 / pH: x  Gluc: x / Ketone: Negative  / Bili: Negative / Urobili: 0.2 mg/dL   Blood: x / Protein: Negative mg/dL / Nitrite: Negative   Leuk Esterase: Trace / RBC: 1-2 /HPF / WBC 10-25 /HPF   Sq Epi: x / Non Sq Epi: x / Bacteria: Few          RADIOLOGY & ADDITIONAL TESTS:  Results Reviewed:   Imaging Personally Reviewed:  Electrocardiogram Personally Reviewed:    COORDINATION OF CARE:  Care Discussed with Consultants/Other Providers [Y/N]:  Prior or Outpatient Records Reviewed [Y/N]:  
PROGRESS NOTE:   Armin Quiros MD  Available on teams    Patient is a 22y old  Male who presents with a chief complaint of seizures    SUBJECTIVE / OVERNIGHT EVENTS:  Reports no new complaints  Denies fever, chills, fatigue, chest pain, shortness of breath, abdominal pain, nausea/ vomiting or diarrhea      MEDICATIONS  (STANDING):  enoxaparin Injectable 40 milliGRAM(s) SubCutaneous every 24 hours    MEDICATIONS  (PRN):  LORazepam   Injectable 2 milliGRAM(s) IV Push three times a day PRN generalized tonic-clonic seizure lasting longer than 2 minutes, or two consecutive seizures without return to baseline in-between      CAPILLARY BLOOD GLUCOSE        I&O's Summary      PHYSICAL EXAM:  Vital Signs Last 24 Hrs  T(C): 36.7 (20 Sep 2022 13:22), Max: 37.1 (20 Sep 2022 04:50)  T(F): 98 (20 Sep 2022 13:22), Max: 98.7 (20 Sep 2022 04:50)  HR: 71 (20 Sep 2022 13:22) (66 - 71)  BP: 118/71 (20 Sep 2022 13:22) (110/71 - 123/77)  BP(mean): --  RR: 16 (20 Sep 2022 13:22) (16 - 18)  SpO2: 98% (19 Sep 2022 21:05) (98% - 98%)    Parameters below as of 19 Sep 2022 21:05  Patient On (Oxygen Delivery Method): room air          GENERAL: No acute distress, well-developed  HEAD:  Atraumatic, Normocephalic  EYES: EOMI, PERRLA, conjunctiva and sclera clear  NECK: Supple, no lymphadenopathy, no JVD  CHEST/LUNG: CTAB; No wheezes, rales, or rhonchi  HEART: Regular rate and rhythm; No murmurs, rubs, or gallops  ABDOMEN: Soft, non-tender, non-distended; normal bowel sounds, no organomegaly  EXTREMITIES:  2+ peripheral pulses b/l, No clubbing, cyanosis, or edema  NEUROLOGY: A&O x 3, no focal deficits  SKIN: No rashes or lesions    LABS:                        16.4   11.43 )-----------( 328      ( 19 Sep 2022 08:01 )             49.1     09-19    139  |  102  |  13  ----------------------------<  101<H>  4.7   |  28  |  0.7    Ca    10.6<H>      19 Sep 2022 08:01  Mg     2.2         TPro  7.2  /  Alb  4.8  /  TBili  0.3  /  DBili  x   /  AST  22  /  ALT  26  /  AlkPhos  138<H>            Urinalysis Basic - ( 18 Sep 2022 22:10 )    Color: Yellow / Appearance: Clear / S.025 / pH: x  Gluc: x / Ketone: Negative  / Bili: Negative / Urobili: 0.2 mg/dL   Blood: x / Protein: Negative mg/dL / Nitrite: Negative   Leuk Esterase: Trace / RBC: 1-2 /HPF / WBC 10-25 /HPF   Sq Epi: x / Non Sq Epi: x / Bacteria: Few          RADIOLOGY & ADDITIONAL TESTS:  Results Reviewed:   Imaging Personally Reviewed:  Electrocardiogram Personally Reviewed:    COORDINATION OF CARE:  Care Discussed with Consultants/Other Providers [Y/N]:  Prior or Outpatient Records Reviewed [Y/N]:

## 2022-09-21 NOTE — DISCHARGE NOTE NURSING/CASE MANAGEMENT/SOCIAL WORK - PATIENT PORTAL LINK FT
You can access the FollowMyHealth Patient Portal offered by [facility] by registering at the following website: [email or web address]. By joining [facility]’s FollowMyHealth portal, you will also be able to view your health information using other applications (apps) compatible with our system.

## 2022-09-21 NOTE — DISCHARGE NOTE PROVIDER - CARE PROVIDER_API CALL
Erick Lopez)  Internal Medicine  [address] [place] [address], [address] [place] [address]  Phone: [phone]  Fax: [phone]  Scheduled Appointment: 10/05/2022 01:30 PM

## 2022-09-21 NOTE — DISCHARGE NOTE PROVIDER - HOSPITAL COURSE
22y Male w/ No PMHx aside from Head Trauma 16 years ago with +skull fracture, no neuro-intervention p/w first time seizure. Around 1030PM on 09/17 brother noticed Pt shaking in bed. Brother describe him w/ neck hyperextended, eyes rolled back, foaming of the mouth, b/l elbow flexion, extended legs w/ body shaking. Brother expressed that event lasted for what felt like 7 mins. After the event ended he would called his brother name w/out response other than a eye opening then closed. Pt regain consciousness when ambulance arrived. Pt also had post ictal urinary incontinence.  Pt was also nauseated and had one episode of NB vomiting and a mild HA. Pt reported a h/o trying to wake up but unable to 3 months ago which felt similar without the shaking. He denies h/o seizure, recent head trauma, recent illness or drug use. He currently drinks 7-8beers occasionally but had 2 beers yesterday. Patient was admitted to epilepsy unit for further work up and monitoring. Seizure precautions set in place. Patient received a VEEG which was normal. Patient received a Head CT which resulted in no acute changes. 22y Male w/ No PMHx aside from Head Trauma 16 years ago with +skull fracture, no neuro-intervention p/w first time seizure. Around 1030PM on 09/17 brother noticed Pt shaking in bed. Brother describe him w/ neck hyperextended, eyes rolled back, foaming of the mouth, b/l elbow flexion, extended legs w/ body shaking. Brother expressed that event lasted for what felt like 7 mins. After the event ended he would called his brother name w/out response other than a eye opening then closed. Pt regain consciousness when ambulance arrived. Pt also had post ictal urinary incontinence.  Pt was also nauseated and had one episode of NB vomiting and a mild HA. Pt reported a h/o trying to wake up but unable to 3 months ago which felt similar without the shaking. He denies h/o seizure, recent head trauma, recent illness or drug use. He currently drinks 7-8beers occasionally but had 2 beers yesterday. Patient was admitted to epilepsy unit for further work up and monitoring.  VEEG demonstrated no supporting evidence of epilepsy  was present on this study. The mild right frontal slowing is consistent with the patient's history of right frontal encephalomalacia.   Today VEEG monitoring completed, patient is clear for discharge from neurology standpoint. No seizure medications recommended. Pt will follow up at [facility]  on November 29 at 1 pm. No driving/operating machinery x 1 year as per [place] law.  Pt was also scheduled  a medical appointment at [facility] clinic given he does not presently have a PCP. 22y Male w/ No PMHx aside from Head Trauma 16 years ago with +skull fracture, no neuro-intervention p/w first time seizure. Around 1030PM on 09/17 brother noticed Pt shaking in bed. Brother describe him w/ neck hyperextended, eyes rolled back, foaming of the mouth, b/l elbow flexion, extended legs w/ body shaking. Brother expressed that event lasted for what felt like 7 mins. After the event ended he would called his brother name w/out response other than a eye opening then closed. Pt regain consciousness when ambulance arrived. Pt also had post ictal urinary incontinence.  Pt was also nauseated and had one episode of NB vomiting and a mild HA. Pt reported a h/o trying to wake up but unable to 3 months ago which felt similar without the shaking. He denies h/o seizure, recent head trauma, recent illness or drug use. He currently drinks 7-8beers occasionally but had 2 beers yesterday. Patient was admitted to epilepsy unit for further work up and monitoring.  VEEG demonstrated no supporting evidence of epilepsy  was present on this study. The mild right frontal slowing is consistent with the patient's history of right frontal encephalomalacia.   Today VEEG monitoring completed, patient is clear for discharge from neurology standpoint. No seizure medications recommended. Pt will follow up at [facility]  on November 29 at 1 pm. No driving/operating machinery x 1 year [place] per [place] law.  Pt was also scheduled  a medical appointment at [facility] clinic given he does not presently have a PCP.     attending attestation:  patient seen and examined on day of discharge  labs and vital signs reviewed  patient feels wells  has no complaints  agrees with plan to be discharged and f/u in [facility] clinic

## 2022-09-21 NOTE — DISCHARGE NOTE PROVIDER - NSDCCPCAREPLAN_GEN_ALL_CORE_FT
PRINCIPAL DISCHARGE DIAGNOSIS  Diagnosis: Seizure-like activity  Assessment and Plan of Treatment: Patient placed on video EEG monitoring. Epilepsy consult completed.       PRINCIPAL DISCHARGE DIAGNOSIS  Diagnosis: Seizure-like activity  Assessment and Plan of Treatment: - today VEEG monitoring completed and you were cleared for discharge.  - your follow up appointment is scheduled at [facility] on  November 29 at 1 pm.  [address] [place] [id number]  [phone]  - no seizure medications recommended upon discharge  - no driving/operating machinery x 1 year as per [facility] law  - please follow up with out [facility] clinic on 10/5 at 1:30 pm for your medical appointment       PRINCIPAL DISCHARGE DIAGNOSIS  Diagnosis: Seizure-like activity  Assessment and Plan of Treatment: - today VEEG monitoring completed and you were cleared for discharge.  - your follow up appointment is scheduled at [facility] on  November 29 at 1 pm.  [address] [place] [address]  [phone]  - no seizure medications recommended upon discharge  - no driving/operating machinery x 1 year as per [facility] law  - please follow up with out [facility] on 10/5 at 1:30 pm for your medical appointment      SECONDARY DISCHARGE DIAGNOSES  Diagnosis: History of painful urination  Assessment and Plan of Treatment: urinalysis and urine culture negative  f/u in [facility] clinic     PRINCIPAL DISCHARGE DIAGNOSIS  Diagnosis: Seizure-like activity  Assessment and Plan of Treatment: - today VEEG monitoring completed and you were cleared for discharge.  - your follow up appointment is scheduled at [facility] on  November 29 at 1 pm.  [address] [place] [address]  [phone]  - no seizure medications recommended upon discharge  - no driving/operating machinery x 1 year as per [facility] law  - please follow up with out [facility] on 10/5 at 1:30 pm for your medical appointment      SECONDARY DISCHARGE DIAGNOSES  Diagnosis: History of painful urination  Assessment and Plan of Treatment: urinalysis and urine culture negative  f/u in [facility] clinic with primary care

## 2022-09-27 NOTE — CDI QUERY NOTE - NSCDIOTHERTXTBX_GEN_ALL_CORE_HH
CDI CLARIFICATION.  Documented:  22y Male w/ No PMHx aside from Head Trauma 16 years ago with +skull fracture, no neuro-intervention p/w first time seizure.    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    Neuro: CT head showed chronic right frontal infarct rises suspicion for localized related epilepsy. Recommend admission to [facility] EMU for VEEG.    Uiox -Neuro eval-Veeg per neuro- ativan 2mg IV prn for seizure > 2 mins- Seizure precaution      Discharge note provider :VEEG demonstrated no supporting evidence of epilepsy  was present on this study. The mild right frontal slowing is consistent with the patient's history of right frontal encephalomalacia.   Today VEEG monitoring completed, patient is clear for discharge from neurology standpoint. No seizure medications recommended. Pt will follow up at [facility]  No seizure medications.    Query   Based on your clinical judgment and consideration of these clinical indicators, please clarify if seizure can be further specified as:  • Seizure associated with Chronic infarct of the right inferior frontal lobe  • Seizure not associated with Chronic infarct of the right inferior frontal lobe  • Other (please specify).  • Unable to further specify Seizure.    Thank you,Aviva

## 2022-09-28 ENCOUNTER — EMERGENCY (EMERGENCY)
Facility: HOSPITAL | Age: 22
LOS: 0 days | Discharge: HOME | End: 2022-09-28
Attending: EMERGENCY MEDICINE | Admitting: EMERGENCY MEDICINE

## 2022-09-28 VITALS
OXYGEN SATURATION: 100 % | WEIGHT: 163.8 LBS | HEIGHT: 65 IN | HEART RATE: 61 BPM | SYSTOLIC BLOOD PRESSURE: 144 MMHG | RESPIRATION RATE: 18 BRPM | TEMPERATURE: 98 F | DIASTOLIC BLOOD PRESSURE: 94 MMHG

## 2022-09-28 DIAGNOSIS — R36.9 URETHRAL DISCHARGE, UNSPECIFIED: ICD-10-CM

## 2022-09-28 DIAGNOSIS — R30.0 DYSURIA: ICD-10-CM

## 2022-09-28 LAB
APPEARANCE UR: CLEAR — SIGNIFICANT CHANGE UP
BILIRUB UR-MCNC: NEGATIVE — SIGNIFICANT CHANGE UP
COLOR SPEC: SIGNIFICANT CHANGE UP
DIFF PNL FLD: ABNORMAL
GLUCOSE UR QL: NEGATIVE — SIGNIFICANT CHANGE UP
KETONES UR-MCNC: NEGATIVE — SIGNIFICANT CHANGE UP
LEUKOCYTE ESTERASE UR-ACNC: ABNORMAL
NITRITE UR-MCNC: NEGATIVE — SIGNIFICANT CHANGE UP
PH UR: 7 — SIGNIFICANT CHANGE UP (ref 5–8)
PROT UR-MCNC: NEGATIVE — SIGNIFICANT CHANGE UP
SP GR SPEC: 1.02 — SIGNIFICANT CHANGE UP (ref 1.01–1.03)
UROBILINOGEN FLD QL: SIGNIFICANT CHANGE UP

## 2022-09-28 PROCEDURE — 99284 EMERGENCY DEPT VISIT MOD MDM: CPT

## 2022-09-28 RX ORDER — CEFTRIAXONE 500 MG/1
500 INJECTION, POWDER, FOR SOLUTION INTRAMUSCULAR; INTRAVENOUS ONCE
Refills: 0 | Status: COMPLETED | OUTPATIENT
Start: 2022-09-28 | End: 2022-09-28

## 2022-09-28 RX ADMIN — Medication 100 MILLIGRAM(S): at 23:49

## 2022-09-28 RX ADMIN — CEFTRIAXONE 500 MILLIGRAM(S): 500 INJECTION, POWDER, FOR SOLUTION INTRAMUSCULAR; INTRAVENOUS at 23:50

## 2022-09-28 NOTE — ED PROVIDER NOTE - CLINICAL SUMMARY MEDICAL DECISION MAKING FREE TEXT BOX
21yo M presenting for penile discharge x1mo, dysuria. No fevers/chills, nausea/vomiting, flank pain, abdominal pain. sexually active but last intercourse 4mo ago. Well appearing, NAD, non toxic. NCAT PERRLA EOMI  abdomen s nt nd no rebound no guarding WWPx4 neuro non focal no cva tenderness b/l.  exam performed by Dr. Lange chaperoned by myself- no inguinal masses/tenderness, no penile lesions/tenderness/drainage, no testicular masses, edema, tenderness, +b/l cremasterics intact. discussed empiric treatment. Comfortable with discharge and follow-up outpatient, strict return precautions given. Endorses understanding of all of this and aware that they can return at any time for new or concerning symptoms. No further questions or concerns at this time

## 2022-09-28 NOTE — ED PROVIDER NOTE - OBJECTIVE STATEMENT
22 y m, no pmh, pw penile discharge. Endorses feeling mild pain when peeing for the past 3-4 days. Also endorses clear discharge after peeing. +Vaginal sex 2 months ago, ?protection. No fever/chills, abdominal pain, scrotal pain

## 2022-09-28 NOTE — ED PROVIDER NOTE - PATIENT PORTAL LINK FT
You can access the FollowMyHealth Patient Portal offered by [facility] by registering at the following website: [email or web address]. By joining [email or web address]’s FollowMyHealth portal, you will also be able to view your health information using other applications (apps) compatible with our system.

## 2022-09-28 NOTE — ED PROVIDER NOTE - NSFOLLOWUPCLINICS_GEN_ALL_ED_FT
[facility]  Infectious Diseases  [address] [place] [address]  Phone: [phone]  Fax: [phone]

## 2022-09-28 NOTE — ED PROVIDER NOTE - PHYSICAL EXAMINATION
CONSTITUTIONAL: NAD  SKIN: Warm dry  HEAD: NCAT  EYES: NL inspection  ENT: MMM  NECK: Supple; non tender.  CARD: RRR  RESP: CTAB  ABD: S/NT no R/G  EXT: no pedal edema  NEURO: Grossly unremarkable  PSYCH: Cooperative, appropriate. CONSTITUTIONAL: NAD  SKIN: Warm dry  HEAD: NCAT  EYES: NL inspection  ENT: MMM  NECK: Supple; non tender.  CARD: RRR  RESP: CTAB  ABD: S/NT no R/G  : unremarkable, no lesions, no discharge  EXT: no pedal edema  NEURO: Grossly unremarkable  PSYCH: Cooperative, appropriate.

## 2022-09-29 LAB
BACTERIA # UR AUTO: NEGATIVE — SIGNIFICANT CHANGE UP
EPI CELLS # UR: 1 /HPF — SIGNIFICANT CHANGE UP (ref 0–5)
HIV 1 & 2 AB SERPL IA.RAPID: SIGNIFICANT CHANGE UP
HYALINE CASTS # UR AUTO: 0 /LPF — SIGNIFICANT CHANGE UP (ref 0–7)
RBC CASTS # UR COMP ASSIST: 10 /HPF — HIGH (ref 0–4)
T PALLIDUM AB TITR SER: NEGATIVE — SIGNIFICANT CHANGE UP
WBC UR QL: 17 /HPF — HIGH (ref 0–5)

## 2022-09-30 LAB
CULTURE RESULTS: NO GROWTH — SIGNIFICANT CHANGE UP
SPECIMEN SOURCE: SIGNIFICANT CHANGE UP

## 2022-10-05 ENCOUNTER — APPOINTMENT (OUTPATIENT)
Dept: INTERNAL MEDICINE | Facility: CLINIC | Age: 22
End: 2022-10-05

## 2022-10-05 ENCOUNTER — OUTPATIENT (OUTPATIENT)
Dept: OUTPATIENT SERVICES | Facility: HOSPITAL | Age: 22
LOS: 1 days | Discharge: HOME | End: 2022-10-05

## 2022-10-05 VITALS
SYSTOLIC BLOOD PRESSURE: 132 MMHG | TEMPERATURE: 98.3 F | BODY MASS INDEX: 27.66 KG/M2 | HEART RATE: 80 BPM | DIASTOLIC BLOOD PRESSURE: 90 MMHG | WEIGHT: 166 LBS | HEIGHT: 64.96 IN | OXYGEN SATURATION: 99 %

## 2022-10-05 DIAGNOSIS — Z86.73 PERSONAL HISTORY OF TRANSIENT ISCHEMIC ATTACK (TIA), AND CEREBRAL INFARCTION WITHOUT RESIDUAL DEFICITS: ICD-10-CM

## 2022-10-05 DIAGNOSIS — Z87.828 PERSONAL HISTORY OF OTHER (HEALED) PHYSICAL INJURY AND TRAUMA: ICD-10-CM

## 2022-10-05 DIAGNOSIS — R56.9 UNSPECIFIED CONVULSIONS: ICD-10-CM

## 2022-10-05 DIAGNOSIS — Z87.81 PERSONAL HISTORY OF (HEALED) TRAUMATIC FRACTURE: ICD-10-CM

## 2022-10-05 DIAGNOSIS — R73.9 HYPERGLYCEMIA, UNSPECIFIED: ICD-10-CM

## 2022-10-05 DIAGNOSIS — G93.89 OTHER SPECIFIED DISORDERS OF BRAIN: ICD-10-CM

## 2022-10-05 DIAGNOSIS — I69.398 OTHER SEQUELAE OF CEREBRAL INFARCTION: ICD-10-CM

## 2022-10-05 DIAGNOSIS — D72.829 ELEVATED WHITE BLOOD CELL COUNT, UNSPECIFIED: ICD-10-CM

## 2022-10-05 DIAGNOSIS — R30.9 PAINFUL MICTURITION, UNSPECIFIED: ICD-10-CM

## 2022-10-05 PROCEDURE — 99213 OFFICE O/P EST LOW 20 MIN: CPT | Mod: GC

## 2022-10-05 NOTE — ASSESSMENT
[FreeTextEntry1] : patient is a 23 y/o male with no significant pmhx present for hospital follow up.\par \par #penile discharge, \par -recently seen in the ED, s/p course of doxycyclin\par -resolving, continue to monitor, follow up PRN\par \par #?seizure disorder\par -discharged from [facility] on sep 21 for ? seizure like disorder\par -VEEG demonstrated no supporting evidence of epilepsy  was present on this study\par -neurology follow up\par \par #prediabetes\par -ED lab work shoed a1c of 5.7\par -patient is educated on the importance of diet and exercise as well as weight loss\par \par #HCM\par -covid vaccine received\par -blood work today\par -follow up in 6 month or PRN

## 2022-10-05 NOTE — HISTORY OF PRESENT ILLNESS
[FreeTextEntry1] : U [de-identified] : patient is a 21 y/o male with no significant pmhx present for hospital follow up. patient was seen in the ED sep 28 2022 for penile discharge. UA positive for LE and STD screening was negative. patient was discharged with a course of doxycyclin which patient has been compliant. patient states his penile discharge has been resolving. of note, patient was admitted to [facility] sep21 for questionable seizure like activity, video EEG was negative, pt is scheduled to follow up with neurology november 29th

## 2022-10-06 DIAGNOSIS — Z00.00 ENCOUNTER FOR GENERAL ADULT MEDICAL EXAMINATION WITHOUT ABNORMAL FINDINGS: ICD-10-CM

## 2022-11-29 ENCOUNTER — APPOINTMENT (OUTPATIENT)
Dept: NEUROLOGY | Facility: CLINIC | Age: 22
End: 2022-11-29

## 2022-11-29 ENCOUNTER — OUTPATIENT (OUTPATIENT)
Dept: OUTPATIENT SERVICES | Facility: HOSPITAL | Age: 22
LOS: 1 days | Discharge: HOME | End: 2022-11-29

## 2022-11-29 VITALS
TEMPERATURE: 97.7 F | OXYGEN SATURATION: 99 % | SYSTOLIC BLOOD PRESSURE: 121 MMHG | WEIGHT: 156 LBS | DIASTOLIC BLOOD PRESSURE: 84 MMHG | HEIGHT: 64 IN | BODY MASS INDEX: 26.63 KG/M2 | HEART RATE: 74 BPM

## 2022-11-29 DIAGNOSIS — R56.9 UNSPECIFIED CONVULSIONS: ICD-10-CM

## 2022-11-29 PROCEDURE — 99215 OFFICE O/P EST HI 40 MIN: CPT
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
## 2022-11-29 NOTE — REASON FOR VISIT
[Family Member] : family member [Post Hospitalization] : a post hospitalization visit [FreeTextEntry1] : for first time seizure
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
## 2022-11-29 NOTE — PHYSICAL EXAM
[Person] : oriented to person [Place] : oriented to place [Cranial Nerves Optic (II)] : visual acuity intact bilaterally,  visual fields full to confrontation, pupils equal round and reactive to light [Cranial Nerves Oculomotor (III)] : extraocular motion intact [Cranial Nerves Trigeminal (V)] : facial sensation intact symmetrically [Cranial Nerves Facial (VII)] : face symmetrical [Cranial Nerves Glossopharyngeal (IX)] : tongue and palate midline [Cranial Nerves Accessory (XI - Cranial And Spinal)] : head turning and shoulder shrug symmetric [Cranial Nerves Hypoglossal (XII)] : there was no tongue deviation with protrusion [Motor Tone] : muscle tone was normal in all four extremities [Motor Strength] : muscle strength was normal in all four extremities [Sensation Tactile Decrease] : light touch was intact [Abnormal Walk] : normal gait [2+] : Patella right 2+ [3+] : Ankle jerk left 3+ [Time] : disoriented to time [Paresis Pronator Drift Right-Sided] : no pronator drift on the right [Paresis Pronator Drift Left-Sided] : no pronator drift on the left [Romberg's Sign] : Romberg's sign was negtive [Limited Balance] : balance was intact [Past-pointing] : there was no past-pointing [Tremor] : no tremor present

## 2023-03-29 ENCOUNTER — APPOINTMENT (OUTPATIENT)
Dept: INTERNAL MEDICINE | Facility: CLINIC | Age: 23
End: 2023-03-29

## 2023-04-21 ENCOUNTER — INPATIENT (INPATIENT)
Facility: HOSPITAL | Age: 23
LOS: 1 days | Discharge: HOME | DRG: 53 | End: 2023-04-23
Attending: INTERNAL MEDICINE | Admitting: STUDENT IN AN ORGANIZED HEALTH CARE EDUCATION/TRAINING PROGRAM
Payer: COMMERCIAL

## 2023-04-21 VITALS
HEART RATE: 70 BPM | RESPIRATION RATE: 18 BRPM | DIASTOLIC BLOOD PRESSURE: 79 MMHG | TEMPERATURE: 97 F | OXYGEN SATURATION: 98 % | SYSTOLIC BLOOD PRESSURE: 109 MMHG

## 2023-04-21 DIAGNOSIS — R56.9 UNSPECIFIED CONVULSIONS: ICD-10-CM

## 2023-04-21 LAB
ALBUMIN SERPL ELPH-MCNC: 4.5 G/DL — SIGNIFICANT CHANGE UP (ref 3.5–5.2)
ALP SERPL-CCNC: 122 U/L — HIGH (ref 30–115)
ALT FLD-CCNC: 26 U/L — SIGNIFICANT CHANGE UP (ref 0–41)
ANION GAP SERPL CALC-SCNC: 9 MMOL/L — SIGNIFICANT CHANGE UP (ref 7–14)
AST SERPL-CCNC: 19 U/L — SIGNIFICANT CHANGE UP (ref 0–41)
BASOPHILS # BLD AUTO: 0.05 K/UL — SIGNIFICANT CHANGE UP (ref 0–0.2)
BASOPHILS NFR BLD AUTO: 0.5 % — SIGNIFICANT CHANGE UP (ref 0–1)
BILIRUB SERPL-MCNC: 0.3 MG/DL — SIGNIFICANT CHANGE UP (ref 0.2–1.2)
BUN SERPL-MCNC: 14 MG/DL — SIGNIFICANT CHANGE UP (ref 10–20)
CALCIUM SERPL-MCNC: 10.2 MG/DL — SIGNIFICANT CHANGE UP (ref 8.4–10.4)
CHLORIDE SERPL-SCNC: 101 MMOL/L — SIGNIFICANT CHANGE UP (ref 98–110)
CO2 SERPL-SCNC: 26 MMOL/L — SIGNIFICANT CHANGE UP (ref 17–32)
CREAT SERPL-MCNC: 0.7 MG/DL — SIGNIFICANT CHANGE UP (ref 0.7–1.5)
EGFR: 133 ML/MIN/1.73M2 — SIGNIFICANT CHANGE UP
EOSINOPHIL # BLD AUTO: 0.18 K/UL — SIGNIFICANT CHANGE UP (ref 0–0.7)
EOSINOPHIL NFR BLD AUTO: 1.7 % — SIGNIFICANT CHANGE UP (ref 0–8)
ETHANOL SERPL-MCNC: <10 MG/DL — SIGNIFICANT CHANGE UP
GLUCOSE SERPL-MCNC: 117 MG/DL — HIGH (ref 70–99)
HCT VFR BLD CALC: 45.4 % — SIGNIFICANT CHANGE UP (ref 42–52)
HGB BLD-MCNC: 15.8 G/DL — SIGNIFICANT CHANGE UP (ref 14–18)
IMM GRANULOCYTES NFR BLD AUTO: 0.5 % — HIGH (ref 0.1–0.3)
LYMPHOCYTES # BLD AUTO: 19.5 % — LOW (ref 20.5–51.1)
LYMPHOCYTES # BLD AUTO: 2.1 K/UL — SIGNIFICANT CHANGE UP (ref 1.2–3.4)
MCHC RBC-ENTMCNC: 31.7 PG — HIGH (ref 27–31)
MCHC RBC-ENTMCNC: 34.8 G/DL — SIGNIFICANT CHANGE UP (ref 32–37)
MCV RBC AUTO: 91.2 FL — SIGNIFICANT CHANGE UP (ref 80–94)
MONOCYTES # BLD AUTO: 0.74 K/UL — HIGH (ref 0.1–0.6)
MONOCYTES NFR BLD AUTO: 6.9 % — SIGNIFICANT CHANGE UP (ref 1.7–9.3)
NEUTROPHILS # BLD AUTO: 7.64 K/UL — HIGH (ref 1.4–6.5)
NEUTROPHILS NFR BLD AUTO: 70.9 % — SIGNIFICANT CHANGE UP (ref 42.2–75.2)
NRBC # BLD: 0 /100 WBCS — SIGNIFICANT CHANGE UP (ref 0–0)
PLATELET # BLD AUTO: 318 K/UL — SIGNIFICANT CHANGE UP (ref 130–400)
PMV BLD: 9.9 FL — SIGNIFICANT CHANGE UP (ref 7.4–10.4)
POTASSIUM SERPL-MCNC: 4 MMOL/L — SIGNIFICANT CHANGE UP (ref 3.5–5)
POTASSIUM SERPL-SCNC: 4 MMOL/L — SIGNIFICANT CHANGE UP (ref 3.5–5)
PROT SERPL-MCNC: 7.2 G/DL — SIGNIFICANT CHANGE UP (ref 6–8)
RBC # BLD: 4.98 M/UL — SIGNIFICANT CHANGE UP (ref 4.7–6.1)
RBC # FLD: 12 % — SIGNIFICANT CHANGE UP (ref 11.5–14.5)
SARS-COV-2 RNA SPEC QL NAA+PROBE: SIGNIFICANT CHANGE UP
SODIUM SERPL-SCNC: 136 MMOL/L — SIGNIFICANT CHANGE UP (ref 135–146)
WBC # BLD: 10.76 K/UL — SIGNIFICANT CHANGE UP (ref 4.8–10.8)
WBC # FLD AUTO: 10.76 K/UL — SIGNIFICANT CHANGE UP (ref 4.8–10.8)

## 2023-04-21 PROCEDURE — 36415 COLL VENOUS BLD VENIPUNCTURE: CPT

## 2023-04-21 PROCEDURE — 84100 ASSAY OF PHOSPHORUS: CPT

## 2023-04-21 PROCEDURE — 99222 1ST HOSP IP/OBS MODERATE 55: CPT

## 2023-04-21 PROCEDURE — 83735 ASSAY OF MAGNESIUM: CPT

## 2023-04-21 PROCEDURE — 85025 COMPLETE CBC W/AUTO DIFF WBC: CPT

## 2023-04-21 PROCEDURE — 93010 ELECTROCARDIOGRAM REPORT: CPT

## 2023-04-21 PROCEDURE — 99222 1ST HOSP IP/OBS MODERATE 55: CPT | Mod: GC

## 2023-04-21 PROCEDURE — 95700 EEG CONT REC W/VID EEG TECH: CPT

## 2023-04-21 PROCEDURE — U0005: CPT

## 2023-04-21 PROCEDURE — U0003: CPT

## 2023-04-21 PROCEDURE — 95716 VEEG EA 12-26HR CONT MNTR: CPT

## 2023-04-21 PROCEDURE — 80053 COMPREHEN METABOLIC PANEL: CPT

## 2023-04-21 PROCEDURE — 70450 CT HEAD/BRAIN W/O DYE: CPT | Mod: 26,MA

## 2023-04-21 PROCEDURE — 99285 EMERGENCY DEPT VISIT HI MDM: CPT

## 2023-04-21 RX ORDER — LEVETIRACETAM 250 MG/1
1000 TABLET, FILM COATED ORAL
Refills: 0 | Status: DISCONTINUED | OUTPATIENT
Start: 2023-04-21 | End: 2023-04-21

## 2023-04-21 RX ORDER — ENOXAPARIN SODIUM 100 MG/ML
40 INJECTION SUBCUTANEOUS EVERY 24 HOURS
Refills: 0 | Status: DISCONTINUED | OUTPATIENT
Start: 2023-04-21 | End: 2023-04-23

## 2023-04-21 RX ORDER — LEVETIRACETAM 250 MG/1
1000 TABLET, FILM COATED ORAL ONCE
Refills: 0 | Status: COMPLETED | OUTPATIENT
Start: 2023-04-21 | End: 2023-04-21

## 2023-04-21 RX ORDER — ACETAMINOPHEN 500 MG
975 TABLET ORAL ONCE
Refills: 0 | Status: DISCONTINUED | OUTPATIENT
Start: 2023-04-21 | End: 2023-04-23

## 2023-04-21 RX ORDER — SODIUM CHLORIDE 9 MG/ML
1000 INJECTION INTRAMUSCULAR; INTRAVENOUS; SUBCUTANEOUS ONCE
Refills: 0 | Status: COMPLETED | OUTPATIENT
Start: 2023-04-21 | End: 2023-04-21

## 2023-04-21 RX ORDER — LEVETIRACETAM 250 MG/1
500 TABLET, FILM COATED ORAL
Refills: 0 | Status: DISCONTINUED | OUTPATIENT
Start: 2023-04-21 | End: 2023-04-23

## 2023-04-21 RX ADMIN — LEVETIRACETAM 400 MILLIGRAM(S): 250 TABLET, FILM COATED ORAL at 13:02

## 2023-04-21 RX ADMIN — LEVETIRACETAM 500 MILLIGRAM(S): 250 TABLET, FILM COATED ORAL at 16:33

## 2023-04-21 RX ADMIN — SODIUM CHLORIDE 2000 MILLILITER(S): 9 INJECTION INTRAMUSCULAR; INTRAVENOUS; SUBCUTANEOUS at 09:46

## 2023-04-21 RX ADMIN — Medication 2 MILLIGRAM(S): at 13:02
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
## 2023-04-21 NOTE — H&P ADULT - NSHPLABSRESULTS_GEN_ALL_CORE
15.8   10.76 )-----------( 318      ( 21 Apr 2023 09:36 )             45.4     04-21    136  |  101  |  14  ----------------------------<  117<H>  4.0   |  26  |  0.7    Ca    10.2      21 Apr 2023 09:36    TPro  7.2  /  Alb  4.5  /  TBili  0.3  /  DBili  x   /  AST  19  /  ALT  26  /  AlkPhos  122<H>  04-21              LIVER FUNCTIONS - ( 21 Apr 2023 09:36 )  Alb: 4.5 g/dL / Pro: 7.2 g/dL / ALK PHOS: 122 U/L / ALT: 26 U/L / AST: 19 U/L / GGT: x                     RADIOLOGY & ADDITIONAL STUDIES:    ACC: [id number] EXAM:  CT BRAIN   ORDERED BY: PAIGE VORA DATE:  04/21/2023          INTERPRETATION:  CLINICAL INDICATION: Seizure.    Technique: CT of the head was performed without contrast.    Multiple contiguous axial images were acquired from the skullbase to the   vertex without the administration of intravenous contrast.  Coronal and   sagittal reformations were made.    COMPARISON:  prior head CT dated 9/18/2022.    FINDINGS:    Redemonstration of a chronic traumatic contusion involving the anterior   right frontal lobe with calvarial chronic fracture deformity. Ventricles   and sulci are otherwise unremarkable.     There is no intraparenchymal hematoma, mass effect or midline shift. No   abnormal extra-axial fluid collections are present.    The visualized intraorbital compartments, paranasal sinuses and mastoid   complexes appear free of acute disease.    IMPRESSION:  No acute intracranial pathology. No evidence of midline shift, mass   effect or intracranial hemorrhage.    Chronic right frontal traumatic contusion.

## 2023-04-21 NOTE — H&P ADULT - NSHPPHYSICALEXAM_GEN_ALL_CORE
LOS:     VITALS:   T(C): 36.2 (04-21-23 @ 08:24), Max: 36.2 (04-21-23 @ 08:24)  HR: 70 (04-21-23 @ 08:24) (70 - 70)  BP: 109/79 (04-21-23 @ 08:24) (109/79 - 109/79)  RR: 18 (04-21-23 @ 08:24) (18 - 18)  SpO2: 98% (04-21-23 @ 08:24) (98% - 98%)    GENERAL: NAD, lying in bed comfortably  HEAD:  Atraumatic, Normocephalic  EYES: EOMI, PERRLA, conjunctiva and sclera clear  ENT: Moist mucous membranes  NECK: Supple, No JVD  CHEST/LUNG: Clear to auscultation bilaterally; No rales, rhonchi, wheezing, or rubs. Unlabored respirations  HEART: Regular rate and rhythm; No murmurs, rubs, or gallops  ABDOMEN: BSx4; Soft, nontender, nondistended  EXTREMITIES:  2+ Peripheral Pulses, brisk capillary refill. No clubbing, cyanosis, or edema  NERVOUS SYSTEM:  A&Ox3, no focal deficits   SKIN: No rashes or lesions VITALS:   T(C): 36.2 (04-21-23 @ 08:24), Max: 36.2 (04-21-23 @ 08:24)  HR: 70 (04-21-23 @ 08:24) (70 - 70)  BP: 109/79 (04-21-23 @ 08:24) (109/79 - 109/79)  RR: 18 (04-21-23 @ 08:24) (18 - 18)  SpO2: 98% (04-21-23 @ 08:24) (98% - 98%)    GENERAL: NAD, lying in bed comfortably  HEAD:  Atraumatic, Normocephalic  EYES: EOMI, PERRLA, conjunctiva and sclera clear  CHEST/LUNG: Clear to auscultation bilaterally; No wheezing   HEART: Regular rate and rhythm; No murmurs, rubs, or gallops  ABDOMEN: BSx4; Soft, nontender, nondistended  EXTREMITIES:  no LE edema  NERVOUS SYSTEM:  A&Ox3

## 2023-04-21 NOTE — ED PROVIDER NOTE - PHYSICAL EXAMINATION
Gen: NAD  Head: NCAT  ENT: MMM, + small laceration to left tongue.  Eyes: NL inspection  Neck: supple  Cardio: RRR  Pulm: No resp distress  Abd: S/NT no R/G  Extrem: No pedal edema  Neuro: Cranial nerves normal.  Awake and alert.  Follows commands.  Normal motor and sensation.  Normal gait.  No cerebellar signs.  Psyche: cooperative

## 2023-04-21 NOTE — H&P ADULT - ATTENDING COMMENTS
#seizure disorder: 2 seizures, one at home and one in ER  - had recent work up on 9/2022 including VEEG which was neg  - CTh neg for acute opathology  - electrolytes wnl  - seen by neuro, s/p 1 gram Keprra, c/w Keprra 500mg bid  - transfer to [facility] for VEEG  - keep K > 4 and Mg > 2  - ativan 2mg IV prn for seizure > 2 mins  - Seizure precaution    #Hx of traumatic brain injury, right frontal encephalomalacia.     #Alcohol use   - CIWA 0.   - monitor for withdrawal.     #dvt ppx

## 2023-04-21 NOTE — ED ADULT TRIAGE NOTE - CHIEF COMPLAINT QUOTE
Patient had a seizure x 3 minutes in his bed this morning witnessed by family. No lacerations noted in triage. . History of seizure, last one September 2022. Patient not on any medication.

## 2023-04-21 NOTE — PATIENT PROFILE ADULT - FALL HARM RISK - HARM RISK INTERVENTIONS

## 2023-04-21 NOTE — ED PROVIDER NOTE - CLINICAL SUMMARY MEDICAL DECISION MAKING FREE TEXT BOX
.    Patient with seizure.  All available lab tests, imaging tests, and EKGs independently reviewed and interpreted by me.  No acute focal finding.  Patient seen by neurology.  Recommended loading with Keppra, did discharge with same for neurology follow-up.  However patient had seizure in the emergency department.  Given Ativan and Keppra with cessation of seizure.  Patient reconsulted by neurology, patient awake and alert and following commands after seizure.  Patient admitted to [facility] for epilepsy unit

## 2023-04-21 NOTE — ED ADULT NURSE NOTE - OBJECTIVE STATEMENT
Pt c/o seizure while sleeping this morning witnessed by brother. Pt states he has no memory of the event. States he had 1 previous seizure and was not given any medication for it. Pt has no lacerations. Pt denies medical hx and allergies. At this time pt A&Ox4, breathing unlabored and spont on RA.
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
## 2023-04-21 NOTE — ED PROVIDER NOTE - CONSIDERATION OF ADMISSION OBSERVATION
Given HPI, PMH, PE, w/up and ED course, will admit/ place in OBS. Consideration of Admission/Observation

## 2023-04-21 NOTE — CONSULT NOTE ADULT - SUBJECTIVE AND OBJECTIVE BOX
Neurology Consult    Patient is a 22y old  Male who presents with a chief complaint of seizures    HPI:  The patient is a 22 y.o. male, with a PMH of head trauma 16 years ago and a previous seizure in 9/2022 with a normal VEEG and no antiepileptic medications started, who presents to the hospital for a seizure that occurred at 6AM this morning as witnessed by brother. Neuro was consulted as this is the patient's second case of seizures and the patient had been lost to follow-up after the first episode. The patient was seen and examined today. As per the brother, it happened while the patient was sleeping; he became stiff, his eyes were rolling back, and he had shaking of the arms and legs for 3 min. He had post-ictal confusion for 20-30 min. He had an episode of vomiting upon waking up and had shortness of breath and nasal congestion right after the seizure resolved. The patient had complete LOC; he only recalls being surrounded by EMS. The patient reports having a headache right now, which he rates as 5/10, along with a little neck pain although he has no trouble moving it. He claims that he had a cold a month ago, but denies any recent fever or chills. His last alcohol use was 7 beers last week, and he denies recreational drug use.    PAST MEDICAL & SURGICAL HISTORY:  Seizure      No significant past surgical history          FAMILY HISTORY:      Social History:  - drinks "occasionally" - 6 drinks at once every 1-2 months  - last use was 7 beers last week  - denies recreational drug use    Allergies    No Known Allergies    Intolerances        MEDICATIONS  (STANDING):  acetaminophen     Tablet .. 975 milliGRAM(s) Oral once    MEDICATIONS  (PRN):      Review of systems:    Constitutional: as per HPI  Eyes: No eye pain or discharge  ENMT:  No difficulty hearing; No sinus or throat pain  Neck: neck pain  Respiratory: Nasal congestion right after seizure had resolved; No cough, wheezing, chills or hemoptysis  Cardiovascular: Shortness of breath right after seizure had resolved; No chest pain, palpitations, or dyspnea on exertion  Gastrointestinal: No abdominal pain, diarrhea, or constipation.   Genitourinary: No dysuria, frequency, hematuria or incontinence  Neurological: As per HPI  Skin: No rashes or lesions   Endocrine: No heat or cold intolerance  Musculoskeletal: No joint pain or swelling      Vital Signs Last 24 Hrs  T(C): 36.2 (21 Apr 2023 08:24), Max: 36.2 (21 Apr 2023 08:24)  T(F): 97.2 (21 Apr 2023 08:24), Max: 97.2 (21 Apr 2023 08:24)  HR: 70 (21 Apr 2023 08:24) (70 - 70)  BP: 109/79 (21 Apr 2023 08:24) (109/79 - 109/79)  BP(mean): --  RR: 18 (21 Apr 2023 08:24) (18 - 18)  SpO2: 98% (21 Apr 2023 08:24) (98% - 98%)    Parameters below as of 21 Apr 2023 08:24  Patient On (Oxygen Delivery Method): room air        Examination:  General:  Appearance is consistent with chronologic age.  No abnormal facies.  Gross skin survey within normal limits.    Cognitive/Language:  The patient is oriented to person and place; he was able to name the current month but said year 2022 instead of 2023. Language with normal comprehension. Nondysarthric.    Eyes: intact VA, VFF. PERRL.  No ptosis/weakness of eyelid closure.    Face:  Facial sensation normal V1 - 3, no facial asymmetry.    Ears/Nose/Throat:  Hearing grossly intact b/l.  Palate elevates midline.  Motor examination:   Normal tone, bulk and range of motion.  No tenderness, twitching, tremors or involuntary movements.  Formal Muscle Strength Testing: (MRC grade R/L) 5/5 UE; 5/5 LE.  Reflexes:   2+ b/l biceps, brachioradialis, patella and Achilles.  Plantar response downgoing b/l.  Sensory examination:   Intact to light touch in all extremities.  Cerebellum:   FTN intact with normal ELEANOR in all limbs.  No dysmetria or dysdiadokinesia.      Labs:   CBC Full  -  ( 21 Apr 2023 09:36 )  WBC Count : 10.76 K/uL  RBC Count : 4.98 M/uL  Hemoglobin : 15.8 g/dL  Hematocrit : 45.4 %  Platelet Count - Automated : 318 K/uL  Mean Cell Volume : 91.2 fL  Mean Cell Hemoglobin : 31.7 pg  Mean Cell Hemoglobin Concentration : 34.8 g/dL  Auto Neutrophil # : 7.64 K/uL  Auto Lymphocyte # : 2.10 K/uL  Auto Monocyte # : 0.74 K/uL  Auto Eosinophil # : 0.18 K/uL  Auto Basophil # : 0.05 K/uL  Auto Neutrophil % : 70.9 %  Auto Lymphocyte % : 19.5 %  Auto Monocyte % : 6.9 %  Auto Eosinophil % : 1.7 %  Auto Basophil % : 0.5 %    04-21    136  |  101  |  14  ----------------------------<  117<H>  4.0   |  26  |  0.7    Ca    10.2      21 Apr 2023 09:36    TPro  7.2  /  Alb  4.5  /  TBili  0.3  /  DBili  x   /  AST  19  /  ALT  26  /  AlkPhos  122<H>  04-21    LIVER FUNCTIONS - ( 21 Apr 2023 09:36 )  Alb: 4.5 g/dL / Pro: 7.2 g/dL / ALK PHOS: 122 U/L / ALT: 26 U/L / AST: 19 U/L / GGT: x                   Neuroimaging:  NCHCT: CT Head No Cont:   ACC: [id number] EXAM:  CT BRAIN   ORDERED BY: PAIGE GRADY     PROCEDURE DATE:  04/21/2023          INTERPRETATION:  CLINICAL INDICATION: Seizure.    Technique: CT of the head was performed without contrast.    Multiple contiguous axial images were acquired from the skullbase to the   vertex without the administration of intravenous contrast.  Coronal and   sagittal reformations were made.    COMPARISON:  prior head CT dated 9/18/2022.    FINDINGS:    Redemonstration of a chronic traumatic contusion involving the anterior   right frontal lobe with calvarial chronic fracture deformity. Ventricles   and sulci are otherwise unremarkable.     There is no intraparenchymal hematoma, mass effect or midline shift. No   abnormal extra-axial fluid collections are present.    The visualized intraorbital compartments, paranasal sinuses and mastoid   complexes appear free of acute disease.    IMPRESSION:  No acute intracranial pathology. No evidence of midline shift, mass   effect or intracranial hemorrhage.    Chronic right frontal traumatic contusion.    --- End of Report ---            SVEN BARR MD; Attending Radiologist  This document has been electronically signed. Apr 21 2023  9:59AM (04-21-23 @ 09:42)      04-21-23 @ 11:01       Neurology Consult    Patient is a 22y old  Male who presents with a chief complaint of seizures    HPI:  [id number]  The patient is a 22 y.o. male, with a PMH of head trauma 16 years ago and a previous seizure in 9/2022 with a normal VEEG and no antiepileptic medications started, who presents to the hospital for a seizure that occurred at 6AM this morning as witnessed by brother. Neuro was consulted as this is the patient's second case of seizures and the patient had been lost to follow-up after the first episode. The patient was seen and examined today. As per the brother, it happened while the patient was sleeping; he became stiff, his eyes were rolling back, and he had shaking of the arms and legs for 3 min. He had post-ictal confusion for 20-30 min. He had an episode of vomiting upon waking up and had shortness of breath and nasal congestion right after the seizure resolved. The patient had complete LOC; he only recalls being surrounded by EMS. The patient reports having a headache right now, which he rates as 5/10, along with a little neck pain although he has no trouble moving it. He claims that he had a cold a month ago, but denies any recent fever or chills. His last alcohol use was 7 beers last week, and he denies recreational drug use.    PAST MEDICAL & SURGICAL HISTORY:  Seizure      No significant past surgical history          FAMILY HISTORY:      Social History:  - drinks "occasionally" - 6 drinks at once every 1-2 months  - last use was 7 beers last week  - denies recreational drug use    Allergies    No Known Allergies    Intolerances        MEDICATIONS  (STANDING):  acetaminophen     Tablet .. 975 milliGRAM(s) Oral once    MEDICATIONS  (PRN):      Review of systems:    Constitutional: as per HPI  Eyes: No eye pain or discharge  ENMT:  No difficulty hearing; No sinus or throat pain  Neck: neck pain  Respiratory: Nasal congestion right after seizure had resolved; No cough, wheezing, chills or hemoptysis  Cardiovascular: Shortness of breath right after seizure had resolved; No chest pain, palpitations, or dyspnea on exertion  Gastrointestinal: No abdominal pain, diarrhea, or constipation.   Genitourinary: No dysuria, frequency, hematuria or incontinence  Neurological: As per HPI  Skin: No rashes or lesions   Endocrine: No heat or cold intolerance  Musculoskeletal: No joint pain or swelling      Vital Signs Last 24 Hrs  T(C): 36.2 (21 Apr 2023 08:24), Max: 36.2 (21 Apr 2023 08:24)  T(F): 97.2 (21 Apr 2023 08:24), Max: 97.2 (21 Apr 2023 08:24)  HR: 70 (21 Apr 2023 08:24) (70 - 70)  BP: 109/79 (21 Apr 2023 08:24) (109/79 - 109/79)  BP(mean): --  RR: 18 (21 Apr 2023 08:24) (18 - 18)  SpO2: 98% (21 Apr 2023 08:24) (98% - 98%)    Parameters below as of 21 Apr 2023 08:24  Patient On (Oxygen Delivery Method): room air        Examination:  General:  Appearance is consistent with chronologic age.  No abnormal facies.  Gross skin survey within normal limits.    Cognitive/Language:  The patient is oriented to person and place; he was able to name the current month but said year 2022 instead of 2023. Language with normal comprehension. Nondysarthric.    Eyes: intact VA, VFF. PERRL.  No ptosis/weakness of eyelid closure.    Face:  Facial sensation normal V1 - 3, no facial asymmetry.    Ears/Nose/Throat:  Hearing grossly intact b/l.  Palate elevates midline.  Motor examination:   Normal tone, bulk and range of motion.  No tenderness, twitching, tremors or involuntary movements.  Formal Muscle Strength Testing: (MRC grade R/L) 5/5 UE; 5/5 LE.  Reflexes:   2+ b/l biceps, brachioradialis, patella and Achilles.  Plantar response downgoing b/l.  Sensory examination:   Intact to light touch in all extremities.  Cerebellum:   FTN intact with normal ELEANOR in all limbs.  No dysmetria or dysdiadokinesia.      Labs:   CBC Full  -  ( 21 Apr 2023 09:36 )  WBC Count : 10.76 K/uL  RBC Count : 4.98 M/uL  Hemoglobin : 15.8 g/dL  Hematocrit : 45.4 %  Platelet Count - Automated : 318 K/uL  Mean Cell Volume : 91.2 fL  Mean Cell Hemoglobin : 31.7 pg  Mean Cell Hemoglobin Concentration : 34.8 g/dL  Auto Neutrophil # : 7.64 K/uL  Auto Lymphocyte # : 2.10 K/uL  Auto Monocyte # : 0.74 K/uL  Auto Eosinophil # : 0.18 K/uL  Auto Basophil # : 0.05 K/uL  Auto Neutrophil % : 70.9 %  Auto Lymphocyte % : 19.5 %  Auto Monocyte % : 6.9 %  Auto Eosinophil % : 1.7 %  Auto Basophil % : 0.5 %    04-21    136  |  101  |  14  ----------------------------<  117<H>  4.0   |  26  |  0.7    Ca    10.2      21 Apr 2023 09:36    TPro  7.2  /  Alb  4.5  /  TBili  0.3  /  DBili  x   /  AST  19  /  ALT  26  /  AlkPhos  122<H>  04-21    LIVER FUNCTIONS - ( 21 Apr 2023 09:36 )  Alb: 4.5 g/dL / Pro: 7.2 g/dL / ALK PHOS: 122 U/L / ALT: 26 U/L / AST: 19 U/L / GGT: x                   Neuroimaging:  NCHCT: CT Head No Cont:   ACC: [id number] EXAM:  CT BRAIN   ORDERED BY: PAIGE GRADY     PROCEDURE DATE:  04/21/2023          INTERPRETATION:  CLINICAL INDICATION: Seizure.    Technique: CT of the head was performed without contrast.    Multiple contiguous axial images were acquired from the skullbase to the   vertex without the administration of intravenous contrast.  Coronal and   sagittal reformations were made.    COMPARISON:  prior head CT dated 9/18/2022.    FINDINGS:    Redemonstration of a chronic traumatic contusion involving the anterior   right frontal lobe with calvarial chronic fracture deformity. Ventricles   and sulci are otherwise unremarkable.     There is no intraparenchymal hematoma, mass effect or midline shift. No   abnormal extra-axial fluid collections are present.    The visualized intraorbital compartments, paranasal sinuses and mastoid   complexes appear free of acute disease.    IMPRESSION:  No acute intracranial pathology. No evidence of midline shift, mass   effect or intracranial hemorrhage.    Chronic right frontal traumatic contusion.    --- End of Report ---            SVEN BARR MD; Attending Radiologist  This document has been electronically signed. Apr 21 2023  9:59AM (04-21-23 @ 09:42)      04-21-23 @ 11:01       Neurology Consult    Patient is a 22y old  Male who presents with a chief complaint of seizures    HPI:  [id number]  The patient is a 22 y.o. male, with a PMH of head trauma 16 years ago and a previous seizure in 9/2022 with a normal VEEG and no antiepileptic medications started, who presents to the hospital for a seizure that occurred at 6AM this morning as witnessed by brother. Neuro was consulted as this is the patient's second case of seizures and the patient had been lost to follow-up after the first episode. The patient was seen and examined today. As per the brother, it happened while the patient was sleeping; he became stiff, his eyes were rolling back, and he had shaking of the arms and legs for 3 min. He had post-ictal confusion for 20-30 min. He had an episode of vomiting upon waking up and had shortness of breath and nasal congestion right after the seizure resolved. The patient had complete LOC; he only recalls being surrounded by EMS. The patient reports having a headache right now, which he rates as 5/10, along with a little neck pain although he has no trouble moving it. He claims that he had a cold a month ago, but denies any recent fever or chills. His last alcohol use was 7 beers last week, and he denies recreational drug use.    PAST MEDICAL & SURGICAL HISTORY:  Seizure      No significant past surgical history          FAMILY HISTORY:      Social History:  - drinks "occasionally" - 6 drinks at once every 1-2 months  - last use was 7 beers last week  - denies recreational drug use    Allergies    No Known Allergies    Intolerances        MEDICATIONS  (STANDING):  acetaminophen     Tablet .. 975 milliGRAM(s) Oral once    MEDICATIONS  (PRN):      Review of systems:    Constitutional: as per HPI  Eyes: No eye pain or discharge  ENMT:  No difficulty hearing; No sinus or throat pain  Neck: neck pain  Respiratory: Nasal congestion right after seizure had resolved; No cough, wheezing, chills or hemoptysis  Cardiovascular: Shortness of breath right after seizure had resolved; No chest pain, palpitations, or dyspnea on exertion  Gastrointestinal: No abdominal pain, diarrhea, or constipation.   Genitourinary: No dysuria, frequency, hematuria or incontinence  Neurological: As per HPI  Skin: No rashes or lesions   Endocrine: No heat or cold intolerance  Musculoskeletal: No joint pain or swelling      Vital Signs Last 24 Hrs  T(C): 36.2 (21 Apr 2023 08:24), Max: 36.2 (21 Apr 2023 08:24)  T(F): 97.2 (21 Apr 2023 08:24), Max: 97.2 (21 Apr 2023 08:24)  HR: 70 (21 Apr 2023 08:24) (70 - 70)  BP: 109/79 (21 Apr 2023 08:24) (109/79 - 109/79)  BP(mean): --  RR: 18 (21 Apr 2023 08:24) (18 - 18)  SpO2: 98% (21 Apr 2023 08:24) (98% - 98%)    Parameters below as of 21 Apr 2023 08:24  Patient On (Oxygen Delivery Method): room air        Examination:  General: Awake. Appearance is consistent with chronologic age.  No abnormal facies.  Gross skin survey within normal limits.    Cognitive/Language:  The patient is oriented to person and place; he was able to name the current month but said year 2022 instead of 2023. Language with normal comprehension. Nondysarthric.    Eyes: intact VA, VFF. PERRL.  No ptosis/weakness of eyelid closure.    Face:  Facial sensation normal V1 - 3, no facial asymmetry.    Ears/Nose/Throat:  Hearing grossly intact b/l.  Palate elevates midline.  Motor examination:   Normal tone, bulk and range of motion.  No tenderness, twitching, tremors or involuntary movements.  Formal Muscle Strength Testing: (MRC grade R/L) 5/5 UE; 5/5 LE.  Reflexes:   2+ b/l biceps, brachioradialis, patella and Achilles.  Plantar response downgoing b/l.  Sensory examination:   Intact to light touch in all extremities.  Cerebellum:   FTN intact with normal ELEANOR in all limbs.  No dysmetria or dysdiadokinesia.      Labs:   CBC Full  -  ( 21 Apr 2023 09:36 )  WBC Count : 10.76 K/uL  RBC Count : 4.98 M/uL  Hemoglobin : 15.8 g/dL  Hematocrit : 45.4 %  Platelet Count - Automated : 318 K/uL  Mean Cell Volume : 91.2 fL  Mean Cell Hemoglobin : 31.7 pg  Mean Cell Hemoglobin Concentration : 34.8 g/dL  Auto Neutrophil # : 7.64 K/uL  Auto Lymphocyte # : 2.10 K/uL  Auto Monocyte # : 0.74 K/uL  Auto Eosinophil # : 0.18 K/uL  Auto Basophil # : 0.05 K/uL  Auto Neutrophil % : 70.9 %  Auto Lymphocyte % : 19.5 %  Auto Monocyte % : 6.9 %  Auto Eosinophil % : 1.7 %  Auto Basophil % : 0.5 %    04-21    136  |  101  |  14  ----------------------------<  117<H>  4.0   |  26  |  0.7    Ca    10.2      21 Apr 2023 09:36    TPro  7.2  /  Alb  4.5  /  TBili  0.3  /  DBili  x   /  AST  19  /  ALT  26  /  AlkPhos  122<H>  04-21    LIVER FUNCTIONS - ( 21 Apr 2023 09:36 )  Alb: 4.5 g/dL / Pro: 7.2 g/dL / ALK PHOS: 122 U/L / ALT: 26 U/L / AST: 19 U/L / GGT: x                   Neuroimaging:  NCHCT: CT Head No Cont:   ACC: [id number] EXAM:  CT BRAIN   ORDERED BY: PAIGE GRADY     PROCEDURE DATE:  04/21/2023          INTERPRETATION:  CLINICAL INDICATION: Seizure.    Technique: CT of the head was performed without contrast.    Multiple contiguous axial images were acquired from the skullbase to the   vertex without the administration of intravenous contrast.  Coronal and   sagittal reformations were made.    COMPARISON:  prior head CT dated 9/18/2022.    FINDINGS:    Redemonstration of a chronic traumatic contusion involving the anterior   right frontal lobe with calvarial chronic fracture deformity. Ventricles   and sulci are otherwise unremarkable.     There is no intraparenchymal hematoma, mass effect or midline shift. No   abnormal extra-axial fluid collections are present.    The visualized intraorbital compartments, paranasal sinuses and mastoid   complexes appear free of acute disease.    IMPRESSION:  No acute intracranial pathology. No evidence of midline shift, mass   effect or intracranial hemorrhage.    Chronic right frontal traumatic contusion.    --- End of Report ---            SVEN BARR MD; Attending Radiologist  This document has been electronically signed. Apr 21 2023  9:59AM (04-21-23 @ 09:42)      04-21-23 @ 11:01       Neurology Consult    Patient is a 22y old  Male who presents with a chief complaint of seizures    HPI:  [id number]  The patient is a 22 y.o. male, with a PMH of head trauma 16 years ago and a previous seizure in 9/2022 with a normal VEEG and no antiepileptic medications started, who presents to the hospital for a seizure that occurred at 6AM this morning as witnessed by brother. Neuro was consulted as this is the patient's second case of seizures and the patient had been lost to follow-up after the first episode. The patient was seen and examined today. As per the brother, it happened while the patient was sleeping; he became stiff, his eyes were rolling back, and he had shaking of the arms and legs for 3 min. He had post-ictal confusion for 20-30 min. He had an episode of vomiting upon waking up and had shortness of breath and nasal congestion right after the seizure resolved. The patient had complete LOC; he only recalls being surrounded by EMS. The patient reports having a headache right now, which he rates as 5/10, along with a little neck pain although he has no trouble moving it. He claims that he had a cold a month ago, but denies any recent fever or chills. His last alcohol use was 7 beers last week, and he denies recreational drug use.    PAST MEDICAL & SURGICAL HISTORY:  Seizure      No significant past surgical history          FAMILY HISTORY:      Social History:  - drinks "occasionally" - 6 drinks at once every 1-2 months  - last use was 7 beers last week  - denies recreational drug use    Allergies    No Known Allergies    Intolerances        MEDICATIONS  (STANDING):  acetaminophen     Tablet .. 975 milliGRAM(s) Oral once    MEDICATIONS  (PRN):      Review of systems:    Constitutional: as per HPI  Eyes: No eye pain or discharge  ENMT:  No difficulty hearing; No sinus or throat pain  Neck: neck pain  Respiratory: Nasal congestion right after seizure had resolved; No cough, wheezing, chills or hemoptysis  Cardiovascular: Shortness of breath right after seizure had resolved; No chest pain, palpitations, or dyspnea on exertion  Gastrointestinal: No abdominal pain, diarrhea, or constipation.   Genitourinary: No dysuria, frequency, hematuria or incontinence  Neurological: As per HPI  Skin: No rashes or lesions   Endocrine: No heat or cold intolerance  Musculoskeletal: No joint pain or swelling      Vital Signs Last 24 Hrs  T(C): 36.2 (21 Apr 2023 08:24), Max: 36.2 (21 Apr 2023 08:24)  T(F): 97.2 (21 Apr 2023 08:24), Max: 97.2 (21 Apr 2023 08:24)  HR: 70 (21 Apr 2023 08:24) (70 - 70)  BP: 109/79 (21 Apr 2023 08:24) (109/79 - 109/79)  BP(mean): --  RR: 18 (21 Apr 2023 08:24) (18 - 18)  SpO2: 98% (21 Apr 2023 08:24) (98% - 98%)    Parameters below as of 21 Apr 2023 08:24  Patient On (Oxygen Delivery Method): room air        Examination:  General: Awake. Appearance is consistent with chronologic age.  No abnormal facies.  Gross skin survey within normal limits.    Cognitive/Language:  The patient is oriented to person and place; he was able to name the current month but said year 2022 instead of 2023. Language with normal comprehension. Nondysarthric.    Eyes: intact VA, VFF. PERRL.  No ptosis/weakness of eyelid closure.    Face:  Facial sensation normal V1 - 3, no facial asymmetry.    Ears/Nose/Throat:  Hearing grossly intact b/l.  Palate elevates midline.  Motor examination:   Normal tone, bulk and range of motion.  No tenderness, twitching, tremors or involuntary movements.  Formal Muscle Strength Testing: (MRC grade R/L) 5/5 UE; 5/5 LE.  Reflexes:   2+ b/l biceps, brachioradialis, patella and Achilles.  Plantar response downgoing b/l.  Sensory examination:   Intact to light touch in all extremities.  Cerebellum:   FTN intact with normal ELEANOR in all limbs.  No dysmetria or dysdiadokinesia.      Labs:   CBC Full  -  ( 21 Apr 2023 09:36 )  WBC Count : 10.76 K/uL  RBC Count : 4.98 M/uL  Hemoglobin : 15.8 g/dL  Hematocrit : 45.4 %  Platelet Count - Automated : 318 K/uL  Mean Cell Volume : 91.2 fL  Mean Cell Hemoglobin : 31.7 pg  Mean Cell Hemoglobin Concentration : 34.8 g/dL  Auto Neutrophil # : 7.64 K/uL  Auto Lymphocyte # : 2.10 K/uL  Auto Monocyte # : 0.74 K/uL  Auto Eosinophil # : 0.18 K/uL  Auto Basophil # : 0.05 K/uL  Auto Neutrophil % : 70.9 %  Auto Lymphocyte % : 19.5 %  Auto Monocyte % : 6.9 %  Auto Eosinophil % : 1.7 %  Auto Basophil % : 0.5 %    04-21    136  |  101  |  14  ----------------------------<  117<H>  4.0   |  26  |  0.7    Ca    10.2      21 Apr 2023 09:36    TPro  7.2  /  Alb  4.5  /  TBili  0.3  /  DBili  x   /  AST  19  /  ALT  26  /  AlkPhos  122<H>  04-21    LIVER FUNCTIONS - ( 21 Apr 2023 09:36 )  Alb: 4.5 g/dL / Pro: 7.2 g/dL / ALK PHOS: 122 U/L / ALT: 26 U/L / AST: 19 U/L / GGT: x                   Neuroimaging:  NCHCT: CT Head No Cont:   ACC: [id number] EXAM:  CT BRAIN   ORDERED BY: PAIGE GRADY     PROCEDURE DATE:  04/21/2023          INTERPRETATION:  CLINICAL INDICATION: Seizure.    Technique: CT of the head was performed without contrast.    Multiple contiguous axial images were acquired from the skullbase to the   vertex without the administration of intravenous contrast.  Coronal and   sagittal reformations were made.    COMPARISON:  prior head CT dated 9/18/2022.    FINDINGS:    Redemonstration of a chronic traumatic contusion involving the anterior   right frontal lobe with calvarial chronic fracture deformity. Ventricles   and sulci are otherwise unremarkable.     There is no intraparenchymal hematoma, mass effect or midline shift. No   abnormal extra-axial fluid collections are present.    The visualized intraorbital compartments, paranasal sinuses and mastoid   complexes appear free of acute disease.    IMPRESSION:  No acute intracranial pathology. No evidence of midline shift, mass   effect or intracranial hemorrhage.    Chronic right frontal traumatic contusion.
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
## 2023-04-21 NOTE — ED PROVIDER NOTE - RATE
[FreeTextEntry6] : Patient with peanut allergy accidently ate an [employer] peanut - immediately noted difficulty breathing and itchiness and gave himself an epipen dose which he needed to inject 3 times as he felt it was not successful the first time - R thigh\par Now with persistent itchiness but improved breathing\par \par  89

## 2023-04-22 LAB
ALBUMIN SERPL ELPH-MCNC: 4.5 G/DL — SIGNIFICANT CHANGE UP (ref 3.5–5.2)
ALP SERPL-CCNC: 137 U/L — HIGH (ref 30–115)
ALT FLD-CCNC: 26 U/L — SIGNIFICANT CHANGE UP (ref 0–41)
ANION GAP SERPL CALC-SCNC: 10 MMOL/L — SIGNIFICANT CHANGE UP (ref 7–14)
AST SERPL-CCNC: 21 U/L — SIGNIFICANT CHANGE UP (ref 0–41)
BASOPHILS # BLD AUTO: 0.05 K/UL — SIGNIFICANT CHANGE UP (ref 0–0.2)
BASOPHILS NFR BLD AUTO: 0.6 % — SIGNIFICANT CHANGE UP (ref 0–1)
BILIRUB SERPL-MCNC: 1 MG/DL — SIGNIFICANT CHANGE UP (ref 0.2–1.2)
BUN SERPL-MCNC: 15 MG/DL — SIGNIFICANT CHANGE UP (ref 10–20)
CALCIUM SERPL-MCNC: 10.1 MG/DL — SIGNIFICANT CHANGE UP (ref 8.4–10.5)
CHLORIDE SERPL-SCNC: 101 MMOL/L — SIGNIFICANT CHANGE UP (ref 98–110)
CO2 SERPL-SCNC: 27 MMOL/L — SIGNIFICANT CHANGE UP (ref 17–32)
CREAT SERPL-MCNC: 0.8 MG/DL — SIGNIFICANT CHANGE UP (ref 0.7–1.5)
EGFR: 128 ML/MIN/1.73M2 — SIGNIFICANT CHANGE UP
EOSINOPHIL # BLD AUTO: 0.08 K/UL — SIGNIFICANT CHANGE UP (ref 0–0.7)
EOSINOPHIL NFR BLD AUTO: 0.9 % — SIGNIFICANT CHANGE UP (ref 0–8)
GLUCOSE SERPL-MCNC: 139 MG/DL — HIGH (ref 70–99)
HCT VFR BLD CALC: 45.6 % — SIGNIFICANT CHANGE UP (ref 42–52)
HGB BLD-MCNC: 16 G/DL — SIGNIFICANT CHANGE UP (ref 14–18)
IMM GRANULOCYTES NFR BLD AUTO: 0.2 % — SIGNIFICANT CHANGE UP (ref 0.1–0.3)
LYMPHOCYTES # BLD AUTO: 2.23 K/UL — SIGNIFICANT CHANGE UP (ref 1.2–3.4)
LYMPHOCYTES # BLD AUTO: 26 % — SIGNIFICANT CHANGE UP (ref 20.5–51.1)
MAGNESIUM SERPL-MCNC: 2.1 MG/DL — SIGNIFICANT CHANGE UP (ref 1.8–2.4)
MCHC RBC-ENTMCNC: 31.2 PG — HIGH (ref 27–31)
MCHC RBC-ENTMCNC: 35.1 G/DL — SIGNIFICANT CHANGE UP (ref 32–37)
MCV RBC AUTO: 88.9 FL — SIGNIFICANT CHANGE UP (ref 80–94)
MONOCYTES # BLD AUTO: 0.62 K/UL — HIGH (ref 0.1–0.6)
MONOCYTES NFR BLD AUTO: 7.2 % — SIGNIFICANT CHANGE UP (ref 1.7–9.3)
NEUTROPHILS # BLD AUTO: 5.57 K/UL — SIGNIFICANT CHANGE UP (ref 1.4–6.5)
NEUTROPHILS NFR BLD AUTO: 65.1 % — SIGNIFICANT CHANGE UP (ref 42.2–75.2)
NRBC # BLD: 0 /100 WBCS — SIGNIFICANT CHANGE UP (ref 0–0)
PHOSPHATE SERPL-MCNC: 2.1 MG/DL — SIGNIFICANT CHANGE UP (ref 2.1–4.9)
PLATELET # BLD AUTO: 318 K/UL — SIGNIFICANT CHANGE UP (ref 130–400)
PMV BLD: 9.7 FL — SIGNIFICANT CHANGE UP (ref 7.4–10.4)
POTASSIUM SERPL-MCNC: 4.1 MMOL/L — SIGNIFICANT CHANGE UP (ref 3.5–5)
POTASSIUM SERPL-SCNC: 4.1 MMOL/L — SIGNIFICANT CHANGE UP (ref 3.5–5)
PROT SERPL-MCNC: 7.3 G/DL — SIGNIFICANT CHANGE UP (ref 6–8)
RBC # BLD: 5.13 M/UL — SIGNIFICANT CHANGE UP (ref 4.7–6.1)
RBC # FLD: 12.1 % — SIGNIFICANT CHANGE UP (ref 11.5–14.5)
SODIUM SERPL-SCNC: 138 MMOL/L — SIGNIFICANT CHANGE UP (ref 135–146)
WBC # BLD: 8.57 K/UL — SIGNIFICANT CHANGE UP (ref 4.8–10.8)
WBC # FLD AUTO: 8.57 K/UL — SIGNIFICANT CHANGE UP (ref 4.8–10.8)

## 2023-04-22 PROCEDURE — 95720 EEG PHY/QHP EA INCR W/VEEG: CPT

## 2023-04-22 PROCEDURE — 99232 SBSQ HOSP IP/OBS MODERATE 35: CPT

## 2023-04-22 PROCEDURE — 99231 SBSQ HOSP IP/OBS SF/LOW 25: CPT

## 2023-04-22 RX ADMIN — LEVETIRACETAM 500 MILLIGRAM(S): 250 TABLET, FILM COATED ORAL at 18:16

## 2023-04-22 RX ADMIN — ENOXAPARIN SODIUM 40 MILLIGRAM(S): 100 INJECTION SUBCUTANEOUS at 18:16

## 2023-04-22 RX ADMIN — LEVETIRACETAM 500 MILLIGRAM(S): 250 TABLET, FILM COATED ORAL at 05:05

## 2023-04-22 NOTE — EEG REPORT - NS EEG TEXT BOX
[facility]  Inpatient Epilepsy Monitoring Unit video-Electroencephalography Report    Acquisition Details:  Electroencephalography was acquired using a minimum of 21 channels on an [employer] Neurology system v 8.5.1 with electrode placement according to the standard International 10-20 system following ACNS (American Clinical Neurophysiology Society) guidelines for Long-Term Video EEG monitoring.  Anterior temporal T1 and T2 electrodes were utilized whenever possible.   The [employer] automated spike & seizure detections were all reviewed in detail, in addition to extensive portions of raw EEG.  Specially-trained nurses were available for seizure-related events.  Continuous live-time video monitoring of the patients for seizure-related and safety events was performed by specially-trained technicians.    Prelim: Normal, no events.  [facility]  Inpatient Epilepsy Monitoring Unit video-Electroencephalography Report    Acquisition Details:  Electroencephalography was acquired using a minimum of 21 channels on an [employer] Neurology system v 8.5.1 with electrode placement according to the standard International 10-20 system following ACNS (American Clinical Neurophysiology Society) guidelines for Long-Term Video EEG monitoring.  Anterior temporal T1 and T2 electrodes were utilized whenever possible.   The [employer] automated spike & seizure detections were all reviewed in detail, in addition to extensive portions of raw EEG.  Specially-trained nurses were available for seizure-related events.  Continuous live-time video monitoring of the patients for seizure-related and safety events was performed by specially-trained technicians.    Prelim: Normal, no events.
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
## 2023-04-23 ENCOUNTER — TRANSCRIPTION ENCOUNTER (OUTPATIENT)
Age: 23
End: 2023-04-23

## 2023-04-23 VITALS
DIASTOLIC BLOOD PRESSURE: 66 MMHG | HEART RATE: 74 BPM | RESPIRATION RATE: 18 BRPM | OXYGEN SATURATION: 96 % | TEMPERATURE: 97 F | SYSTOLIC BLOOD PRESSURE: 109 MMHG

## 2023-04-23 LAB
ALBUMIN SERPL ELPH-MCNC: 4.3 G/DL — SIGNIFICANT CHANGE UP (ref 3.5–5.2)
ALP SERPL-CCNC: 125 U/L — HIGH (ref 30–115)
ALT FLD-CCNC: 23 U/L — SIGNIFICANT CHANGE UP (ref 0–41)
ANION GAP SERPL CALC-SCNC: 11 MMOL/L — SIGNIFICANT CHANGE UP (ref 7–14)
AST SERPL-CCNC: 17 U/L — SIGNIFICANT CHANGE UP (ref 0–41)
BASOPHILS # BLD AUTO: 0.05 K/UL — SIGNIFICANT CHANGE UP (ref 0–0.2)
BASOPHILS NFR BLD AUTO: 0.7 % — SIGNIFICANT CHANGE UP (ref 0–1)
BILIRUB SERPL-MCNC: 0.8 MG/DL — SIGNIFICANT CHANGE UP (ref 0.2–1.2)
BUN SERPL-MCNC: 13 MG/DL — SIGNIFICANT CHANGE UP (ref 10–20)
CALCIUM SERPL-MCNC: 10.4 MG/DL — SIGNIFICANT CHANGE UP (ref 8.4–10.5)
CHLORIDE SERPL-SCNC: 104 MMOL/L — SIGNIFICANT CHANGE UP (ref 98–110)
CO2 SERPL-SCNC: 23 MMOL/L — SIGNIFICANT CHANGE UP (ref 17–32)
CREAT SERPL-MCNC: 0.7 MG/DL — SIGNIFICANT CHANGE UP (ref 0.7–1.5)
EGFR: 133 ML/MIN/1.73M2 — SIGNIFICANT CHANGE UP
EOSINOPHIL # BLD AUTO: 0.23 K/UL — SIGNIFICANT CHANGE UP (ref 0–0.7)
EOSINOPHIL NFR BLD AUTO: 3 % — SIGNIFICANT CHANGE UP (ref 0–8)
GLUCOSE SERPL-MCNC: 96 MG/DL — SIGNIFICANT CHANGE UP (ref 70–99)
HCT VFR BLD CALC: 46.3 % — SIGNIFICANT CHANGE UP (ref 42–52)
HGB BLD-MCNC: 15.9 G/DL — SIGNIFICANT CHANGE UP (ref 14–18)
IMM GRANULOCYTES NFR BLD AUTO: 0.3 % — SIGNIFICANT CHANGE UP (ref 0.1–0.3)
LYMPHOCYTES # BLD AUTO: 3.16 K/UL — SIGNIFICANT CHANGE UP (ref 1.2–3.4)
LYMPHOCYTES # BLD AUTO: 41.5 % — SIGNIFICANT CHANGE UP (ref 20.5–51.1)
MAGNESIUM SERPL-MCNC: 2.2 MG/DL — SIGNIFICANT CHANGE UP (ref 1.8–2.4)
MCHC RBC-ENTMCNC: 31.2 PG — HIGH (ref 27–31)
MCHC RBC-ENTMCNC: 34.3 G/DL — SIGNIFICANT CHANGE UP (ref 32–37)
MCV RBC AUTO: 90.8 FL — SIGNIFICANT CHANGE UP (ref 80–94)
MONOCYTES # BLD AUTO: 0.68 K/UL — HIGH (ref 0.1–0.6)
MONOCYTES NFR BLD AUTO: 8.9 % — SIGNIFICANT CHANGE UP (ref 1.7–9.3)
NEUTROPHILS # BLD AUTO: 3.48 K/UL — SIGNIFICANT CHANGE UP (ref 1.4–6.5)
NEUTROPHILS NFR BLD AUTO: 45.6 % — SIGNIFICANT CHANGE UP (ref 42.2–75.2)
NRBC # BLD: 0 /100 WBCS — SIGNIFICANT CHANGE UP (ref 0–0)
PLATELET # BLD AUTO: 332 K/UL — SIGNIFICANT CHANGE UP (ref 130–400)
PMV BLD: 9.8 FL — SIGNIFICANT CHANGE UP (ref 7.4–10.4)
POTASSIUM SERPL-MCNC: 4.3 MMOL/L — SIGNIFICANT CHANGE UP (ref 3.5–5)
POTASSIUM SERPL-SCNC: 4.3 MMOL/L — SIGNIFICANT CHANGE UP (ref 3.5–5)
PROT SERPL-MCNC: 7.1 G/DL — SIGNIFICANT CHANGE UP (ref 6–8)
RBC # BLD: 5.1 M/UL — SIGNIFICANT CHANGE UP (ref 4.7–6.1)
RBC # FLD: 11.9 % — SIGNIFICANT CHANGE UP (ref 11.5–14.5)
SODIUM SERPL-SCNC: 138 MMOL/L — SIGNIFICANT CHANGE UP (ref 135–146)
WBC # BLD: 7.62 K/UL — SIGNIFICANT CHANGE UP (ref 4.8–10.8)
WBC # FLD AUTO: 7.62 K/UL — SIGNIFICANT CHANGE UP (ref 4.8–10.8)

## 2023-04-23 PROCEDURE — 99232 SBSQ HOSP IP/OBS MODERATE 35: CPT

## 2023-04-23 PROCEDURE — 95720 EEG PHY/QHP EA INCR W/VEEG: CPT

## 2023-04-23 PROCEDURE — 99238 HOSP IP/OBS DSCHRG MGMT 30/<: CPT

## 2023-04-23 RX ORDER — LEVETIRACETAM 250 MG/1
1 TABLET, FILM COATED ORAL
Qty: 60 | Refills: 0
Start: 2023-04-23 | End: 2023-05-22

## 2023-04-23 RX ADMIN — LEVETIRACETAM 500 MILLIGRAM(S): 250 TABLET, FILM COATED ORAL at 05:10

## 2023-04-23 NOTE — DISCHARGE NOTE NURSING/CASE MANAGEMENT/SOCIAL WORK - PATIENT PORTAL LINK FT
You can access the FollowMyHealth Patient Portal offered by [facility] by registering at the following website: [email or web address]. By joining [facility]’s FollowMyHealth portal, you will also be able to view your health information using other applications (apps) compatible with our system. no

## 2023-04-23 NOTE — DISCHARGE NOTE PROVIDER - CARE PROVIDER_API CALL
Channing Ralph)  Neurology  [address] [place] [address], [address] [place] [address]  Phone: [phone]  Fax: [phone]  Follow Up Time:

## 2023-04-23 NOTE — DISCHARGE NOTE PROVIDER - NSDCCPCAREPLAN_GEN_ALL_CORE_FT
PRINCIPAL DISCHARGE DIAGNOSIS  Diagnosis: Seizure  Assessment and Plan of Treatment: Take Keppra and follow with neurology
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
## 2023-04-23 NOTE — DISCHARGE NOTE PROVIDER - NSDCFUSCHEDAPPT_GEN_ALL_CORE_FT
Erick Lopez  [facility]  Scheduled Appointment: 05/24/2023    Erick Lopez  [facility]  [address]  Scheduled Appointment: 05/24/2023

## 2023-04-23 NOTE — EEG REPORT - NS EEG TEXT BOX
VIDEO EEG FINAL REPORT      VALENCIA PADRON    22y Male  MRN [id number]      Recording Technique: The patient underwent continuous video-EEG monitoring using Telemetry System hardware on the [employer]eta[employer] System. EEG and video data were stored on a computer hard drive with important events saved in digital archive files. The material was reviewed by a physician (electroencephalographer/epileptologist) on a daily basis. Tino and seizure detection algorithms were utilized if needed. An EEG technician attended to the patient for 8 to 10 hours per day. The patient was observed by the epilepsy nursing staff 24 hrs per day. The epilepsy center neurologist was available in person or on call 24 hours per day.    Placement and Labeling of Eelectrodes: The EEG was performed using at least 20 channel referential EEG connections (coronal over temporal over parasaggital montage) with inferior temporal electrodes when indicting and using all standard 10-20 electrode placement with EKG, with additional electrodes placed in the inferior temporal region using the modified 10-10 montage electrode placements for elective admissions, or if deemed necessary. Recording was at a sampling rate of 256 samples per second per channel. Time syncronized digital video recording was done simultaneously with EEG recording. A low light infrared camera was used for low light recording.      HPI:  21 y/o male with PMHx of TBI (7 years prior), seizure not on AED presented to the ED for seizure like activity this morning. Seizure was witnessed by brother who reports that patient was unresponsive, eyes rolled back, with whole body shaking. No loss of urine or stools. Episode lasted about 2 mins and patient returned to baseline. Last known well was last night. Brother reports that episode was similar to his seizure in 9/2022. At that time, patient was transferred to epilepsy unit, but was not discharged on AED given first episode of seizure. Denies any recent fevers chills, chest pain, SOB, abdominal pain, nausea, vomiting, diarrhea. Denies any recent alcohol use (although previously stated last drink 1 week ago 7 beers).    In the ED, VS noted for T 36.2, HR 70, /79, RR 18, SpO2 98% on RA. Labs noted for WBC 10, Hg 15, plt 318. K 4.0, Cr 0.7, Glu 118. Alcohol <10. CT head no acute intracranial pathology. Chronic right frontal traumatic contusion.   In ED, had another seizure episode s/p ativan and keppra loading. Plan to transfer to epilepsy unit in [facility].        (21 Apr 2023 14:59)      MEDICATIONS  (STANDING):  acetaminophen     Tablet .. 975 milliGRAM(s) Oral once  enoxaparin Injectable 40 milliGRAM(s) SubCutaneous every 24 hours  levETIRAcetam 500 milliGRAM(s) Oral two times a day    MEDICATIONS  (PRN):  LORazepam   Injectable 2 milliGRAM(s) IV Push every 2 hours PRN CIWA-Ar score increase by 2 points and a total score of 7 or less        AWAKE  The recording during the wakefulness consists of a symmetrical and well-organized background and posterior dominant rhythm in the range of 8-9 Hz, which is reactive to eye opening and eye closure and change in the level of alertness.      ASLEEP  Different stages of sleep were preserved well. Stage II of non-REM sleep was characterized by the presence of symmetrical and well-defined sleep spindles and vertex sharp waves. The deeper stages of sleep were identified by the presence of low theta and delta range activity seen diffusely over both hemispheres and more prominently from the frontal and central derivations. All stages captured and symmetric.      GENERALIZED SLOWING  None    FOCAL SLOWING    None  BREACH ARTIFACT  None    ACTIVATION PROCEDURES    NONE  SLEEP DEPRIVATION/MEDICATION REDUCTION      EPILEPTIFORM ACTIVITY  None          EVENTS/SEIZURES    None  IMPRESSION  Normal VEEG     CLINICAL CORRELATION  A normal VEEG study does not exclude the clinical diagnosis of epilepsy, but no supporting evidence was present on this study.       MD SHANI Canada  Epilepsy Attending, [facility]   Professor, Neurology and Pediatrics, [employer] at [facility].

## 2023-04-27 DIAGNOSIS — Z87.820 PERSONAL HISTORY OF TRAUMATIC BRAIN INJURY: ICD-10-CM

## 2023-04-27 DIAGNOSIS — G40.909 EPILEPSY, UNSPECIFIED, NOT INTRACTABLE, WITHOUT STATUS EPILEPTICUS: ICD-10-CM

## 2023-04-27 DIAGNOSIS — F10.90 ALCOHOL USE, UNSPECIFIED, UNCOMPLICATED: ICD-10-CM

## 2023-05-24 ENCOUNTER — OUTPATIENT (OUTPATIENT)
Dept: OUTPATIENT SERVICES | Facility: HOSPITAL | Age: 23
LOS: 1 days | End: 2023-05-24
Payer: COMMERCIAL

## 2023-05-24 ENCOUNTER — APPOINTMENT (OUTPATIENT)
Dept: INTERNAL MEDICINE | Facility: CLINIC | Age: 23
End: 2023-05-24
Payer: COMMERCIAL

## 2023-05-24 VITALS
HEIGHT: 64 IN | DIASTOLIC BLOOD PRESSURE: 73 MMHG | HEART RATE: 65 BPM | OXYGEN SATURATION: 98 % | SYSTOLIC BLOOD PRESSURE: 129 MMHG | WEIGHT: 164 LBS | TEMPERATURE: 97.3 F | BODY MASS INDEX: 28 KG/M2

## 2023-05-24 DIAGNOSIS — Z00.00 ENCOUNTER FOR GENERAL ADULT MEDICAL EXAMINATION W/OUT ABNORMAL FINDINGS: ICD-10-CM

## 2023-05-24 DIAGNOSIS — Z00.00 ENCOUNTER FOR GENERAL ADULT MEDICAL EXAMINATION WITHOUT ABNORMAL FINDINGS: ICD-10-CM

## 2023-05-24 PROCEDURE — 99213 OFFICE O/P EST LOW 20 MIN: CPT | Mod: GC

## 2023-05-24 PROCEDURE — 99213 OFFICE O/P EST LOW 20 MIN: CPT

## 2023-05-24 NOTE — ASSESSMENT
[FreeTextEntry1] : patient is a 21 y/o male with no significant pmhx present for follow up\par \par \par # High A1c \par - ED lab work showed a1c of 5.7\par - patient is educated on the importance of diet and exercise as well as weight loss\par - need to repeat labs \par \par \par # Right frontal encephalomalacia \par # Hx of seizure like activity\par - following with neurology\par - no new abnormal movements \par - advised not to drive or operate heavy machines \par - follow with neurology, no need for AED \par \par \par #HCM\par - repeat blood work \par - follow up in 1 year or PRN

## 2023-05-24 NOTE — PHYSICAL EXAM
[No Acute Distress] : no acute distress [Normal Sclera/Conjunctiva] : normal sclera/conjunctiva [No Respiratory Distress] : no respiratory distress  [No Accessory Muscle Use] : no accessory muscle use [Clear to Auscultation] : lungs were clear to auscultation bilaterally [Normal S1, S2] : normal S1 and S2 [No Murmur] : no murmur heard [No Edema] : there was no peripheral edema [Soft] : abdomen soft [Non Tender] : non-tender [No Joint Swelling] : no joint swelling [No Rash] : no rash

## 2023-05-24 NOTE — REVIEW OF SYSTEMS
[Fever] : no fever [Discharge] : no discharge [Earache] : no earache [Chest Pain] : no chest pain [Orthopena] : no orthopnea [Shortness Of Breath] : no shortness of breath [Wheezing] : no wheezing [Abdominal Pain] : no abdominal pain [Nausea] : no nausea [Vomiting] : no vomiting [Dysuria] : no dysuria [Hematuria] : no hematuria [Itching] : no itching [Skin Rash] : no skin rash [Headache] : no headache

## 2023-05-24 NOTE — HISTORY OF PRESENT ILLNESS
[FreeTextEntry1] : follow up  [de-identified] : patient is a 23 y/o male with no significant pmhx. He had seizure like activity with no obvious cause, neurology recommended not to start any medication. He has no other new complaints

## 2023-05-25 DIAGNOSIS — Z00.00 ENCOUNTER FOR GENERAL ADULT MEDICAL EXAMINATION WITHOUT ABNORMAL FINDINGS: ICD-10-CM

## 2023-08-21 NOTE — DISCHARGE NOTE NURSING/CASE MANAGEMENT/SOCIAL WORK - NSTRANSFERBELONGINGSDISPO_GEN_A_NUR
RHEUMATOLOGY OFFICE VISIT    Chief Complaint   Patient presents with   • Office Visit     8 month fuv for UCTD       HISTORY OF PRESENT ILLNESS:  This is a pleasant 57 year old White female who is here for follow up.     Elana continues with hydroxychloroquine without difficulty. She states her last eye exam was a couple weeks ago. She reports that she doesn't feel as tired by the end of the day since being on hydroxychloroquine.     She completed PT for her neck pain with improvement.     She recently started walking in the gym due to increased smoke in the air outside. She reports that if she is outside she notices a red rash on her face. She stopped using sunscreen due to a recall on the product she was utilizing.     [facility] also [facility] Zom[facility] [place]    PAST MEDICAL HISTORY:  Past Medical History:   Diagnosis Date   • Abnormal LFTs     elevated AST on [facility] screening 2017   • Abnormal TSH     noted 2017 on [facility] screening   • ADD (attention deficit disorder)     not on meds for this   • Angio-edema    • Benign essential HTN 07/28/2017   • Dysmenorrhea    • Family history of breast cancer in mother    • History of dysmenorrhea    • Hypomagnesemia    • Mental disorder     ADD   • Monoclonal gammopathy    • Multiple myeloma (CMD)    • Undifferentiated connective tissue disease (CMD)    • Urticaria    • Vitamin D deficiency    • Wedge compression fracture of fifth lumbar vertebra (CMD)        PAST SURGICAL HISTORY:  Past Surgical History:   Procedure Laterality Date   • Bone marrow biopsy  03/22/2021    R iliac crest plasma cell myeloma   • Colonoscopy  12/11/2017   • Egd  09/13/2019   • Finger surgery     • Removal of tonsils,<11 y/o     • Vertebroplasty  07/2021    L1       ALLERGIES:  Allergies as of 08/21/2023 - Reviewed 08/21/2023   Allergen Reaction Noted   • Cephalexin SWELLING 07/30/2018   • Cephalosporins THROAT SWELLING 07/28/2018   • Contrast media SWELLING  02/09/2015   • Garlic   (food or med) Other (See Comments) 02/09/2015   • Nuts   (food) SHORTNESS OF BREATH 04/24/1997   • Penicillin g Other (See Comments) 03/29/2021   • Penicillins Other (See Comments) 02/09/2015       MEDICATIONS:  Current Outpatient Medications   Medication Sig Dispense Refill   • hydroxychloroquine (PLAQUENIL) 200 MG tablet Take 1 tablet by mouth 2 times daily. 180 tablet 0   • gabapentin (NEURONTIN) 300 MG capsule Take 2 capsules by mouth 3 times daily. 180 capsule 1   • traMADol HCl 100 MG Tab TAKE 25MG BY MOUTH [id number]     • lidocaine (LIDODERM) 5 % patch APPLY 2 PATCHES TOPICALLY EVERY DAY     • loperamide (IMODIUM) 2 MG capsule Take 1 capsule by mouth as needed.     • metoPROLOL succinate (TOPROL-XL) 100 MG 24 hr tablet Take 0.5 tablets by mouth in the morning and 0.5 tablets in the evening.     • metoPROLOL tartrate (LOPRESSOR) 50 MG tablet      • potassium chloride (KLOR-CON) 10 MEQ ER tablet Take 2 tablets by mouth daily.     • Cholecalciferol (vitamin D3) 25 mcg (1,000 units) capsule 4 tablets by mouth daily     • acyclovir (ZOVIRAX) 400 MG tablet Take 1 tablet by mouth 2 times daily. 60 tablet 5   • dexAMETHasone (DECADRON) 4 MG tablet Take 5 tablets by mouth every 7 days. Total of dexamethasone 20mg weekly. 20 tablet 3   • lenalidomide (REVLIMID) 10 MG capsule Take 1 capsule by mouth daily. Do not start before July 22, 2022. [facility] auth #[id number] obtained on 7/13/22 21 capsule 0   • pantoprazole (PROTONIX) 40 MG tablet Take 40 mg by mouth.     • clopidogrel (PLAVIX) 75 MG tablet Take 75 mg by mouth daily.     • atorvastatin (LIPITOR) 80 MG tablet Take 80 mg by mouth.     • carvedilol (COREG) 12.5 MG tablet Take 12.5 mg by mouth.     • magnesium oxide (MAG-OX) 400 MG tablet Take 400 mg by mouth.     • sacubitril-valsartan (ENTRESTO) 24-26 MG per tablet      • prochlorperazine (COMPAZINE) 10 MG tablet Take 1 tablet by mouth every 6 hours as needed for Nausea or Vomiting. 30 tablet 5   •  spironolactone (ALDACTONE) 25 MG tablet Take 25 mg by mouth daily.     • Misc. Devices (Nasal Spray Bottle) Misc Spray 1 spray in each nostril daily. Ipratropium nasal spray     • cetirizine (ZyrTEC) 10 MG tablet Take 10 mg by mouth daily.     • influenza virus quadrivalent vaccine inactivated, PRESERVATIVE FREE, 0.5 ML injection Inject 0.5 mLs into the muscle 1 time for 1 dose 0.5 mL 0   • Capsaicin-Menthol (Salonpas Pain Rel Gel-Ptch Hot) 0.025-1.25 % Patch Apply 1 patch topically daily as needed (pain).     • calcium carbonate (TUMS) 500 MG chewable tablet Chew 1 tablet by mouth daily.      • aspirin 81 MG chewable tablet Chew 81 mg by mouth daily.     • Fexofenadine HCl (MUCINEX ALLERGY PO) Take 1 tablet by mouth daily as needed (cough).      • acetaminophen (TYLENOL) 325 MG tablet Take 2 tablets by mouth every 4 hours as needed for Pain.     • PROAIR  (90 Base) MCG/ACT inhaler INHALE 2 PUFFS BY MOUTH EVERY 4 HOURS AS NEEDED FOR WHEEZING 9 g 0   • ipratropium (ATROVENT) 0.06 % nasal spray Spray 2 sprays in each nostril 4 times daily. 15 mL 12     No current facility-administered medications for this visit.       FAMILY HISTORY:  Family History   Problem Relation Age of Onset   • Postmenopausal breast cancer Mother 60        unilaterally   • Diabetes Mother    • Congestive Heart Failure Mother    • Osteoarthritis Mother         RA   • Stroke Mother    • Heart disease Mother    • Diabetes Father    • Premenopausal breast cancer Sister 50        late 40's to early 50's   • Diabetes Sister    • Osteoarthritis Sister         RA   • Bleeding Disorder Sister    • Postmenopausal breast cancer Maternal Aunt 50        \"somewhere between her 40's to 50's   • Cancer Maternal Uncle         leukemia, exposed to nuclear bomb in [place] War       SOCIAL HISTORY:  Social History     Tobacco Use   • Smoking status: Never   • Smokeless tobacco: Never   Vaping Use   • Vaping Use: never used   Substance Use Topics   • Alcohol  use: No   • Drug use: No       REVIEW OF SYSTEMS:  See history of present illness.  I reviewed the form filled out by the patient.     PHYSICAL EXAMINATION:  General:  Alert, awake, oriented x3, not in acute distress.  Pleasant and appears stated age.  Vital Signs: Blood pressure 92/61, pulse 80, height 5' 4\" (1.626 m), weight 103.3 kg (227 lb 12.8 oz), last menstrual period 07/16/2019.  Skin:  No palpable purpura, psoriasis or livedo on the upper and lower extremities. No Gottron's papules nor telangiectasia of hands or face.  Diffuse, scaly skin dorsal side of hands bilaterally.  HEENT:  Pink palpebral conjunctivae, anicteric sclerae.  No malar rash, oral ulcers or parotid gland enlargement with moist buccal mucosa.  No heliotrope rash.    Neck:  Supple, no lymphadenopathy with no palpable mass.   Lungs:  No rales or wheezing, resonant to percussion. Normal respiratory effort.   Heart:  No gallop, significant murmur or friction rub.   Abdomen:  Soft, positive bowel sounds, no tenderness. No hepatosplenomegaly or masses appreciated.   Extremities:  No bipedal edema. No sclerodactyly, subcutaneous nodules, fat pad atrophy. No bilateral puffy fingers.  Neurological:  No focal muscle weakness, no slurred speech concerning for stroke.    Physical Exam   Musculoskeletal:    Crepitus in bilateral knees.     Joint Exam 08/21/2023     All documented joints were normal      12/20/2022 8/21/2023   JOHNSON-28 (ESR) -- --   JOHNSON-28 (CRP) -- --   Tender (JOHNSON-28) 0 / 28  0 / 28    Swollen (JOHNSON-28) 0 / 28  0 / 28    Provider Global 1 mm --   Patient Global 0 mm --   ESR -- --   CRP -- --       LABS:  Complement C4 (mg/dL)   Date Value   12/09/2022 33.0     Complement C3 (mg/dL)   Date Value   12/09/2022 155     Double Stranded DNA Ab, IgG (IUnits/mL)   Date Value   12/09/2022 3     IMAGING:  No new/relevant imaging since last visit.    ASSESSMENT & PLAN  Elana was seen today for follow-up.    Diagnoses and all orders for this  visit:    Undifferentiated connective tissue disease  Previous Dr. Flores patient- Positive OSEAS 1: 80 in homogeneous pattern and anti-dsDNA of 12 (no crithidia confirmatory testing). Manifested as joint pain and stiffness on exam. Currently stable on examination today. Normal complements and normalized dsDNA on labs 12/2022. I am re-evaluating if this is a true undifferentiated connective tissue disease or a mild positive OSEAS with osteoarthritis causing arthralgias. Patient is open to discontinuing hydroxychloroquine and we will monitor her labs off this medication.     - Currently on hydroxychloroquine, but is interested in discontinuing it. Risks, benefits, and side effects of medication were reviewed with patient. Discontinue hydroxychloroquine after vacation and advised to update us at any point if anything changes.  - Obtain laboratory work prior to next visit.  -     Complement C3 Complement C4; Future  -     Anti DSDNA; Future  -     Crithidia Lucillae; Future    Long-term use of hydroxychloroquine  Obtain yearly eye exam. It is up to date.    All her questions were addressed. Return in about 6 months (around 2/21/2024). The patient was advised to call back should any questions or concerns arise in the interim.  I will be in contact with the patient via patient portal and/or phone regarding their results - if any finding requires closer follow-up or further testing, we will call the patient.    Shelia Fong MD, MSc  Rheumatologist  [facility] [place] [facility] [place]      On 8/21/2023, I, Bonita Jaimes MA scribed the services personally performed by Shelia Fong MD    The documentation recorded by the scribe accurately and completely reflects the service(s) I personally performed and the decisions made by me.        not applicable

## 2023-08-25 NOTE — PATIENT PROFILE ADULT - BILL PAYMENT
RRT called to bedside at 1757 for a CPAP.    Per patient, chest pain started 10 minutes and the pain is ongoing. Patient reports that they had this pain yesterday. Pain is described as aching and stabbinh, rated at 8/10, and is located midsternum to abdomen . Pain is reproducible and radiates to the abdomen to the surgical sites. Alleviating factors include pain medication; however, the pain is aggravated by deep breathing.      Vital signs are stable.  HR:71  BP: 102/66  SPO2: 95%  RR: 16    EKG obtained at 1812 per protocol. EKG was read by Dr. Hector COE, and no STEMI was noted. Primary MD was notified of CPAP by primary RN for further management of pain. Cardiology was put on consult.    As per protocol, RRT team member will follow-up with patient in 12 hours.   Rodrigo Lau M.D.  [address]  [place] [address]  [phone]  3/16/2019    RE: Danny Rhodes  : 2018  [address] [facility] [address]    To Whom It May Concern,      Danny Rhodes was seen in the clinic today for symptoms of an illness that were present this week.   Please excuse the absences to provide care of a sick child.      Sincerely,      Rodrigo Lau M.D.    no

## 2023-12-05 ENCOUNTER — APPOINTMENT (OUTPATIENT)
Dept: NEUROLOGY | Facility: CLINIC | Age: 23
End: 2023-12-05

## 2025-01-30 NOTE — ED ADULT NURSE NOTE - NS ED NURSE DC INFO COMPLEXITY
Detail Level: Detailed Depth Of Biopsy: dermis Was A Bandage Applied: Yes Size Of Lesion In Cm: 0 Biopsy Type: H and E Biopsy Method: Dermablade Anesthesia Type: 1% lidocaine with epinephrine Anesthesia Volume In Cc: 0.5 Hemostasis: Drysol Wound Care: Petrolatum Dressing: bandage Destruction After The Procedure: No Type Of Destruction Used: Curettage Curettage Text: The wound bed was treated with curettage after the biopsy was performed. Cryotherapy Text: The wound bed was treated with cryotherapy after the biopsy was performed. Electrodesiccation Text: The wound bed was treated with electrodesiccation after the biopsy was performed. Electrodesiccation And Curettage Text: The wound bed was treated with electrodesiccation and curettage after the biopsy was performed. Silver Nitrate Text: The wound bed was treated with silver nitrate after the biopsy was performed. Lab: -[id number] Lab Facility: [id number] Medical Necessity Information: It is in your best interest to select a reason for this procedure from the list below. All of these items fulfill various CMS LCD requirements except the new and changing color options. Consent: Written consent was obtained and risks were reviewed including but not limited to scarring, infection, bleeding, scabbing, incomplete removal, nerve damage and allergy to anesthesia. Post-Care Instructions: I reviewed with the patient in detail post-care instructions. Patient is to keep the biopsy site dry overnight, and then apply vaseline and a bandage  daily until healed. Notification Instructions: Patient will be notified of biopsy results. However, patient instructed to call the office if not contacted within 2 weeks. Billing Type: Third-Party Bill Information: Selecting Yes will display possible errors in your note based on the variables you have selected. This validation is only offered as a suggestion for you. PLEASE NOTE THAT THE VALIDATION TEXT WILL BE REMOVED WHEN YOU FINALIZE YOUR NOTE. IF YOU WANT TO FAX A PRELIMINARY NOTE YOU WILL NEED TO TOGGLE THIS TO 'NO' IF YOU DO NOT WANT IT IN YOUR FAXED NOTE. Simple: Patient demonstrates quick and easy understanding/Verbalized Understanding

## 2025-05-05 NOTE — ED ADULT NURSE NOTE - NSSUHOSCREENINGYN_ED_ALL_ED
Lolita is an 85 year old male who recently presented to [facility] with stroke (left putamen, likely cardioembolic despite being on eliquis for AF, possibly non compliant) and incidental finding of high grade stenosis of cervical YUKO (~90%) who is scheduled for elective YUKO plasty/stent with Dr. Matthews 5/5/25. Additional medical history: CKD (stage II), anemia, CAD, CABG, CHF, thrombocytopenia (last plt count 131), left CEA.       Patient was instructed to start plavix 75 mg daily 5 days prior to carotid stenting. He was instructed to discontinue aspirin when he starts plavix. He was instructed to continue eliquis 5 mg BID for eliquis as currently prescribed. Patient reports last dose plavix and eliquis this morning. Patient reports NPO since midnight. He denies new symptoms today.     Son present at bedside. Polish video  used during visit, Shelbi.      Patient was seen and provided medical clearance for procedure on 4/22/25 by PCP, Dr. Elif Aguayo. Patient was provided cardiac clearance for procedure on 4/29/25 by Kat Rios PA-C. Pre op labs and H&P/progress notes reviewed and are in epic. Type and screen done today, P2Y12 72 (therapeutic).     Vitals:    05/05/25 1305   BP: 113/53   Pulse: (!) 58   Resp: 16   Temp: 97.3 °F (36.3 °C)     EXAM  Alert and oriented x 3, no acute distress (some delay in year but eventually says May 2025)  EOM intact, PERRLA  Saturating well on room air  NSR on monitor  Strength 5/5 BL UE and 4+/5 BL LE  Follows commands, speech fluent and nondysarthric, face symmetrical, tongue midline, shoulder shrug symmetrical, no drift, SILT  Some stuttering but no obvious loss of fluency   Distal peripheral pulses 2+ throughout    Procedure discussed in detail with patient including indications, risks, and recovery. Questions answered. Written consent obtained. Patient dispo to NCCU post procedure. Discussed with Dr. Matthews.     Ni Haynes PA-C     Yes - the patient is able to be screened

## 2025-07-14 ENCOUNTER — RESULT REVIEW (OUTPATIENT)
Age: 25
End: 2025-07-14

## 2025-07-14 ENCOUNTER — OUTPATIENT (OUTPATIENT)
Dept: EMERGENCY DEPT | Facility: HOSPITAL | Age: 25
LOS: 1 days | Discharge: HOME | DRG: 395 | End: 2025-07-14
Payer: MEDICAID

## 2025-07-14 VITALS
DIASTOLIC BLOOD PRESSURE: 88 MMHG | RESPIRATION RATE: 18 BRPM | HEART RATE: 88 BPM | WEIGHT: 167.99 LBS | SYSTOLIC BLOOD PRESSURE: 132 MMHG | HEIGHT: 64 IN | OXYGEN SATURATION: 100 % | TEMPERATURE: 98 F

## 2025-07-14 DIAGNOSIS — K35.80 UNSPECIFIED ACUTE APPENDICITIS: ICD-10-CM

## 2025-07-14 LAB
ALBUMIN SERPL ELPH-MCNC: 4.4 G/DL — SIGNIFICANT CHANGE UP (ref 3.5–5.2)
ALP SERPL-CCNC: 137 U/L — HIGH (ref 30–115)
ALT FLD-CCNC: 22 U/L — SIGNIFICANT CHANGE UP (ref 0–41)
ANION GAP SERPL CALC-SCNC: 10 MMOL/L — SIGNIFICANT CHANGE UP (ref 7–14)
APPEARANCE UR: CLEAR — SIGNIFICANT CHANGE UP
AST SERPL-CCNC: 18 U/L — SIGNIFICANT CHANGE UP (ref 0–41)
BACTERIA # UR AUTO: NEGATIVE /HPF — SIGNIFICANT CHANGE UP
BASOPHILS # BLD AUTO: 0.03 K/UL — SIGNIFICANT CHANGE UP (ref 0–0.2)
BASOPHILS # BLD AUTO: 0.06 K/UL — SIGNIFICANT CHANGE UP (ref 0–0.2)
BASOPHILS NFR BLD AUTO: 0.2 % — SIGNIFICANT CHANGE UP (ref 0–1)
BASOPHILS NFR BLD AUTO: 0.4 % — SIGNIFICANT CHANGE UP (ref 0–1)
BILIRUB SERPL-MCNC: 1 MG/DL — SIGNIFICANT CHANGE UP (ref 0.2–1.2)
BILIRUB UR-MCNC: NEGATIVE — SIGNIFICANT CHANGE UP
BUN SERPL-MCNC: 14 MG/DL — SIGNIFICANT CHANGE UP (ref 10–20)
BUN SERPL-MCNC: 18 MG/DL — SIGNIFICANT CHANGE UP (ref 10–20)
CALCIUM SERPL-MCNC: 9.3 MG/DL — SIGNIFICANT CHANGE UP (ref 8.4–10.5)
CALCIUM SERPL-MCNC: 9.8 MG/DL — SIGNIFICANT CHANGE UP (ref 8.4–10.5)
CAST: 0 /LPF — SIGNIFICANT CHANGE UP (ref 0–4)
CHLORIDE SERPL-SCNC: 100 MMOL/L — SIGNIFICANT CHANGE UP (ref 98–110)
CHLORIDE SERPL-SCNC: 104 MMOL/L — SIGNIFICANT CHANGE UP (ref 98–110)
CO2 SERPL-SCNC: 22 MMOL/L — SIGNIFICANT CHANGE UP (ref 17–32)
CO2 SERPL-SCNC: 24 MMOL/L — SIGNIFICANT CHANGE UP (ref 17–32)
COLOR SPEC: YELLOW — SIGNIFICANT CHANGE UP
CREAT SERPL-MCNC: 0.8 MG/DL — SIGNIFICANT CHANGE UP (ref 0.7–1.5)
CREAT SERPL-MCNC: 0.9 MG/DL — SIGNIFICANT CHANGE UP (ref 0.7–1.5)
DIFF PNL FLD: NEGATIVE — SIGNIFICANT CHANGE UP
EGFR: 121 ML/MIN/1.73M2 — SIGNIFICANT CHANGE UP
EGFR: 121 ML/MIN/1.73M2 — SIGNIFICANT CHANGE UP
EGFR: 125 ML/MIN/1.73M2 — SIGNIFICANT CHANGE UP
EGFR: 125 ML/MIN/1.73M2 — SIGNIFICANT CHANGE UP
EOSINOPHIL # BLD AUTO: 0.01 K/UL — SIGNIFICANT CHANGE UP (ref 0–0.7)
EOSINOPHIL # BLD AUTO: 0.09 K/UL — SIGNIFICANT CHANGE UP (ref 0–0.7)
EOSINOPHIL NFR BLD AUTO: 0.1 % — SIGNIFICANT CHANGE UP (ref 0–8)
EOSINOPHIL NFR BLD AUTO: 0.5 % — SIGNIFICANT CHANGE UP (ref 0–8)
GLUCOSE SERPL-MCNC: 122 MG/DL — HIGH (ref 70–99)
GLUCOSE SERPL-MCNC: 130 MG/DL — HIGH (ref 70–99)
GLUCOSE UR QL: NEGATIVE MG/DL — SIGNIFICANT CHANGE UP
HCT VFR BLD CALC: 43.5 % — SIGNIFICANT CHANGE UP (ref 42–52)
HCT VFR BLD CALC: 47.9 % — SIGNIFICANT CHANGE UP (ref 42–52)
HGB BLD-MCNC: 14.4 G/DL — SIGNIFICANT CHANGE UP (ref 14–18)
HGB BLD-MCNC: 16.3 G/DL — SIGNIFICANT CHANGE UP (ref 14–18)
IMM GRANULOCYTES NFR BLD AUTO: 0.4 % — HIGH (ref 0.1–0.3)
IMM GRANULOCYTES NFR BLD AUTO: 0.6 % — HIGH (ref 0.1–0.3)
KETONES UR QL: ABNORMAL MG/DL
LEUKOCYTE ESTERASE UR-ACNC: NEGATIVE — SIGNIFICANT CHANGE UP
LYMPHOCYTES # BLD AUTO: 1.04 K/UL — LOW (ref 1.2–3.4)
LYMPHOCYTES # BLD AUTO: 1.83 K/UL — SIGNIFICANT CHANGE UP (ref 1.2–3.4)
LYMPHOCYTES # BLD AUTO: 11 % — LOW (ref 20.5–51.1)
LYMPHOCYTES # BLD AUTO: 6.4 % — LOW (ref 20.5–51.1)
MCHC RBC-ENTMCNC: 31.3 PG — HIGH (ref 27–31)
MCHC RBC-ENTMCNC: 31.7 PG — HIGH (ref 27–31)
MCHC RBC-ENTMCNC: 33.1 G/DL — SIGNIFICANT CHANGE UP (ref 32–37)
MCHC RBC-ENTMCNC: 34 G/DL — SIGNIFICANT CHANGE UP (ref 32–37)
MCV RBC AUTO: 91.9 FL — SIGNIFICANT CHANGE UP (ref 80–94)
MCV RBC AUTO: 95.8 FL — HIGH (ref 80–94)
MONOCYTES # BLD AUTO: 0.6 K/UL — SIGNIFICANT CHANGE UP (ref 0.1–0.6)
MONOCYTES # BLD AUTO: 1.4 K/UL — HIGH (ref 0.1–0.6)
MONOCYTES NFR BLD AUTO: 3.7 % — SIGNIFICANT CHANGE UP (ref 1.7–9.3)
MONOCYTES NFR BLD AUTO: 8.4 % — SIGNIFICANT CHANGE UP (ref 1.7–9.3)
NEUTROPHILS # BLD AUTO: 13.26 K/UL — HIGH (ref 1.4–6.5)
NEUTROPHILS # BLD AUTO: 14.52 K/UL — HIGH (ref 1.4–6.5)
NEUTROPHILS NFR BLD AUTO: 79.3 % — HIGH (ref 42.2–75.2)
NEUTROPHILS NFR BLD AUTO: 89 % — HIGH (ref 42.2–75.2)
NITRITE UR-MCNC: NEGATIVE — SIGNIFICANT CHANGE UP
NRBC BLD AUTO-RTO: 0 /100 WBCS — SIGNIFICANT CHANGE UP (ref 0–0)
NRBC BLD AUTO-RTO: 0 /100 WBCS — SIGNIFICANT CHANGE UP (ref 0–0)
PH UR: 7 — SIGNIFICANT CHANGE UP (ref 5–8)
PLATELET # BLD AUTO: 266 K/UL — SIGNIFICANT CHANGE UP (ref 130–400)
PLATELET # BLD AUTO: 342 K/UL — SIGNIFICANT CHANGE UP (ref 130–400)
PMV BLD: 10 FL — SIGNIFICANT CHANGE UP (ref 7.4–10.4)
PMV BLD: 10.2 FL — SIGNIFICANT CHANGE UP (ref 7.4–10.4)
POTASSIUM SERPL-MCNC: 4.2 MMOL/L — SIGNIFICANT CHANGE UP (ref 3.5–5)
POTASSIUM SERPL-MCNC: 4.3 MMOL/L — SIGNIFICANT CHANGE UP (ref 3.5–5)
POTASSIUM SERPL-SCNC: 4.2 MMOL/L — SIGNIFICANT CHANGE UP (ref 3.5–5)
POTASSIUM SERPL-SCNC: 4.3 MMOL/L — SIGNIFICANT CHANGE UP (ref 3.5–5)
PROT SERPL-MCNC: 7.7 G/DL — SIGNIFICANT CHANGE UP (ref 6–8)
PROT UR-MCNC: 30 MG/DL
RBC # BLD: 4.54 M/UL — LOW (ref 4.7–6.1)
RBC # BLD: 5.21 M/UL — SIGNIFICANT CHANGE UP (ref 4.7–6.1)
RBC # FLD: 12.3 % — SIGNIFICANT CHANGE UP (ref 11.5–14.5)
RBC # FLD: 12.5 % — SIGNIFICANT CHANGE UP (ref 11.5–14.5)
RBC CASTS # UR COMP ASSIST: 2 /HPF — SIGNIFICANT CHANGE UP (ref 0–4)
SODIUM SERPL-SCNC: 136 MMOL/L — SIGNIFICANT CHANGE UP (ref 135–146)
SODIUM SERPL-SCNC: 138 MMOL/L — SIGNIFICANT CHANGE UP (ref 135–146)
SP GR SPEC: >1.03 — HIGH (ref 1–1.03)
SQUAMOUS # UR AUTO: 0 /HPF — SIGNIFICANT CHANGE UP (ref 0–5)
UROBILINOGEN FLD QL: 1 MG/DL — SIGNIFICANT CHANGE UP (ref 0.2–1)
WBC # BLD: 16.3 K/UL — HIGH (ref 4.8–10.8)
WBC # BLD: 16.71 K/UL — HIGH (ref 4.8–10.8)
WBC # FLD AUTO: 16.3 K/UL — HIGH (ref 4.8–10.8)
WBC # FLD AUTO: 16.71 K/UL — HIGH (ref 4.8–10.8)
WBC UR QL: 1 /HPF — SIGNIFICANT CHANGE UP (ref 0–5)

## 2025-07-14 PROCEDURE — 74177 CT ABD & PELVIS W/CONTRAST: CPT | Mod: 26

## 2025-07-14 PROCEDURE — 99284 EMERGENCY DEPT VISIT MOD MDM: CPT | Mod: 57

## 2025-07-14 PROCEDURE — 84100 ASSAY OF PHOSPHORUS: CPT

## 2025-07-14 PROCEDURE — 93010 ELECTROCARDIOGRAM REPORT: CPT

## 2025-07-14 PROCEDURE — C1889: CPT

## 2025-07-14 PROCEDURE — 93005 ELECTROCARDIOGRAM TRACING: CPT

## 2025-07-14 PROCEDURE — 36415 COLL VENOUS BLD VENIPUNCTURE: CPT

## 2025-07-14 PROCEDURE — 44970 LAPAROSCOPY APPENDECTOMY: CPT

## 2025-07-14 PROCEDURE — 83735 ASSAY OF MAGNESIUM: CPT

## 2025-07-14 PROCEDURE — 99285 EMERGENCY DEPT VISIT HI MDM: CPT

## 2025-07-14 PROCEDURE — C9399: CPT

## 2025-07-14 PROCEDURE — 88304 TISSUE EXAM BY PATHOLOGIST: CPT

## 2025-07-14 PROCEDURE — 71045 X-RAY EXAM CHEST 1 VIEW: CPT | Mod: 26

## 2025-07-14 PROCEDURE — 85025 COMPLETE CBC W/AUTO DIFF WBC: CPT

## 2025-07-14 PROCEDURE — 71045 X-RAY EXAM CHEST 1 VIEW: CPT

## 2025-07-14 PROCEDURE — 80048 BASIC METABOLIC PNL TOTAL CA: CPT

## 2025-07-14 RX ORDER — ACETAMINOPHEN 500 MG/5ML
650 LIQUID (ML) ORAL EVERY 6 HOURS
Refills: 0 | Status: DISCONTINUED | OUTPATIENT
Start: 2025-07-14 | End: 2025-07-15

## 2025-07-14 RX ORDER — PIPERACILLIN-TAZO-DEXTROSE,ISO 3.375G/5
3.38 IV SOLUTION, PIGGYBACK PREMIX FROZEN(ML) INTRAVENOUS ONCE
Refills: 0 | Status: COMPLETED | OUTPATIENT
Start: 2025-07-14 | End: 2025-07-14

## 2025-07-14 RX ORDER — PIPERACILLIN-TAZO-DEXTROSE,ISO 3.375G/5
3.38 IV SOLUTION, PIGGYBACK PREMIX FROZEN(ML) INTRAVENOUS EVERY 8 HOURS
Refills: 0 | Status: DISCONTINUED | OUTPATIENT
Start: 2025-07-14 | End: 2025-07-14

## 2025-07-14 RX ORDER — OXYCODONE HYDROCHLORIDE 30 MG/1
5 TABLET ORAL EVERY 6 HOURS
Refills: 0 | Status: DISCONTINUED | OUTPATIENT
Start: 2025-07-14 | End: 2025-07-15

## 2025-07-14 RX ORDER — KETOROLAC TROMETHAMINE 30 MG/ML
15 INJECTION, SOLUTION INTRAMUSCULAR; INTRAVENOUS ONCE
Refills: 0 | Status: DISCONTINUED | OUTPATIENT
Start: 2025-07-14 | End: 2025-07-15

## 2025-07-14 RX ORDER — ENOXAPARIN SODIUM 100 MG/ML
40 INJECTION SUBCUTANEOUS EVERY 24 HOURS
Refills: 0 | Status: DISCONTINUED | OUTPATIENT
Start: 2025-07-14 | End: 2025-07-15

## 2025-07-14 RX ORDER — KETOROLAC TROMETHAMINE 30 MG/ML
15 INJECTION, SOLUTION INTRAMUSCULAR; INTRAVENOUS EVERY 6 HOURS
Refills: 0 | Status: DISCONTINUED | OUTPATIENT
Start: 2025-07-14 | End: 2025-07-14

## 2025-07-14 RX ORDER — SODIUM CHLORIDE 9 G/1000ML
1000 INJECTION, SOLUTION INTRAVENOUS
Refills: 0 | Status: DISCONTINUED | OUTPATIENT
Start: 2025-07-14 | End: 2025-07-14

## 2025-07-14 RX ORDER — HYDROMORPHONE/SOD CHLOR,ISO/PF 2 MG/10 ML
1 SYRINGE (ML) INJECTION
Refills: 0 | Status: DISCONTINUED | OUTPATIENT
Start: 2025-07-14 | End: 2025-07-14

## 2025-07-14 RX ORDER — SODIUM CHLORIDE 9 G/1000ML
1000 INJECTION, SOLUTION INTRAVENOUS ONCE
Refills: 0 | Status: COMPLETED | OUTPATIENT
Start: 2025-07-14 | End: 2025-07-14

## 2025-07-14 RX ORDER — ENOXAPARIN SODIUM 100 MG/ML
40 INJECTION SUBCUTANEOUS EVERY 24 HOURS
Refills: 0 | Status: DISCONTINUED | OUTPATIENT
Start: 2025-07-14 | End: 2025-07-14

## 2025-07-14 RX ORDER — ONDANSETRON HCL/PF 4 MG/2 ML
4 VIAL (ML) INJECTION ONCE
Refills: 0 | Status: COMPLETED | OUTPATIENT
Start: 2025-07-14 | End: 2025-07-14

## 2025-07-14 RX ORDER — CEFOTETAN DISODIUM 1 G
2 VIAL (EA) INJECTION ONCE
Refills: 0 | Status: COMPLETED | OUTPATIENT
Start: 2025-07-14 | End: 2025-07-14

## 2025-07-14 RX ORDER — ACETAMINOPHEN 500 MG/5ML
1000 LIQUID (ML) ORAL ONCE
Refills: 0 | Status: DISCONTINUED | OUTPATIENT
Start: 2025-07-14 | End: 2025-07-14

## 2025-07-14 RX ORDER — ONDANSETRON HCL/PF 4 MG/2 ML
4 VIAL (ML) INJECTION ONCE
Refills: 0 | Status: DISCONTINUED | OUTPATIENT
Start: 2025-07-14 | End: 2025-07-14

## 2025-07-14 RX ORDER — KETOROLAC TROMETHAMINE 30 MG/ML
15 INJECTION, SOLUTION INTRAMUSCULAR; INTRAVENOUS ONCE
Refills: 0 | Status: DISCONTINUED | OUTPATIENT
Start: 2025-07-14 | End: 2025-07-14

## 2025-07-14 RX ORDER — PIPERACILLIN-TAZO-DEXTROSE,ISO 3.375G/5
3.38 IV SOLUTION, PIGGYBACK PREMIX FROZEN(ML) INTRAVENOUS EVERY 8 HOURS
Refills: 0 | Status: DISCONTINUED | OUTPATIENT
Start: 2025-07-14 | End: 2025-07-15

## 2025-07-14 RX ORDER — HYDROMORPHONE/SOD CHLOR,ISO/PF 2 MG/10 ML
0.5 SYRINGE (ML) INJECTION
Refills: 0 | Status: DISCONTINUED | OUTPATIENT
Start: 2025-07-14 | End: 2025-07-14

## 2025-07-14 RX ADMIN — SODIUM CHLORIDE 1000 MILLILITER(S): 9 INJECTION, SOLUTION INTRAVENOUS at 06:25

## 2025-07-14 RX ADMIN — Medication 25 GRAM(S): at 15:00

## 2025-07-14 RX ADMIN — ENOXAPARIN SODIUM 40 MILLIGRAM(S): 100 INJECTION SUBCUTANEOUS at 23:37

## 2025-07-14 RX ADMIN — SODIUM CHLORIDE 100 MILLILITER(S): 9 INJECTION, SOLUTION INTRAVENOUS at 22:56

## 2025-07-14 RX ADMIN — SODIUM CHLORIDE 110 MILLILITER(S): 9 INJECTION, SOLUTION INTRAVENOUS at 11:39

## 2025-07-14 RX ADMIN — Medication 650 MILLIGRAM(S): at 23:37

## 2025-07-14 RX ADMIN — Medication 25 GRAM(S): at 23:37

## 2025-07-14 RX ADMIN — Medication 100 GRAM(S): at 10:01

## 2025-07-14 RX ADMIN — Medication 200 GRAM(S): at 11:39

## 2025-07-14 RX ADMIN — KETOROLAC TROMETHAMINE 15 MILLIGRAM(S): 30 INJECTION, SOLUTION INTRAMUSCULAR; INTRAVENOUS at 17:59

## 2025-07-14 RX ADMIN — Medication 4 MILLIGRAM(S): at 06:26

## 2025-07-14 RX ADMIN — KETOROLAC TROMETHAMINE 15 MILLIGRAM(S): 30 INJECTION, SOLUTION INTRAMUSCULAR; INTRAVENOUS at 06:25

## 2025-07-14 NOTE — CHART NOTE - NSCHARTNOTEFT_GEN_A_CORE
PACU ANESTHESIA ADMISSION NOTE      Procedure:   Post op diagnosis:      ____  Intubated  TV:______       Rate: ______      FiO2: ______    __x__  Patent Airway    ____  Full return of protective reflexes    ____  Full recovery from anesthesia / back to baseline     Vitals:   T:     98      R:   12               BP:      106/66            Sat:  96%                 P:  83      Mental Status:  __x__ Awake   _____ Alert   _____ Drowsy   _____ Sedated    Nausea/Vomiting:  __x__ NO  ______Yes,   See Post - Op Orders          Pain Scale (0-10):  _____    Treatment: ____ None    ___x_ See Post - Op/PCA Orders    Post - Operative Fluids:   ____ Oral   ___x_ See Post - Op Orders    Plan: Discharge:   ____Home       ___x__Floor     _____Critical Care    _____  Other:_________________    Comments: Uneventful intraoperative course. No anesthesia issues or complications noted.  Patient stable upon arrival to PACU. Report given to RN. Discharge when criteria met.

## 2025-07-14 NOTE — PATIENT PROFILE ADULT - FALL HARM RISK - HARM RISK INTERVENTIONS

## 2025-07-14 NOTE — ED PROVIDER NOTE - CONSIDERATION OF ADMISSION OBSERVATION
Patient with acute appendicitis on CT - will require admission Consideration of Admission/Observation

## 2025-07-14 NOTE — H&P ADULT - ASSESSMENT
ASSESSMENT:  25yM w/ PMHx of seizure on Keppra, who presented with RLQ abd pain, N/V. RLQ tenderness, CT concern for acute appendicitis. Clinic picture for acute appendicitis. Physical exam findings, imaging, and labs as documented above.     PLAN:  - Admit to 4C under Dr. Franco  - NPO + IVF  - Zosyn  - Add on for OR for diagnostic lap, possible lap appy, possible open  - DVT + GI ppx  - Ambulation as tolerating  - Pain control    Above plan discussed with Attending Surgeon Dr. Franco, patient, and ED team  07-14-25 @ 10:45      EGS SPECTRA: [id number]

## 2025-07-14 NOTE — ED PROVIDER NOTE - CLINICAL SUMMARY MEDICAL DECISION MAKING FREE TEXT BOX
24 yo man w/ RLQ pain and n/v cocern for appy  labs, CT a/p, reassess Patient presented with acute onset of epigastric abdominal pain that progressed to right lower quadrant abdominal pain associated with nausea as documented.  Tender on exam but otherwise afebrile, hemodynamically stable, overall nontoxic-appearing.  Obtained labs which showed leukocytosis to 16,000 but otherwise were grossly unremarkable including no significant anemia, signs of dehydration/BREN, transaminitis or significant electrolyte abnormalities.  UA negative for infection.  CT of the abdomen pelvis however showed acute appendicitis without evidence of fluid collection or perforation.  Consulted surgery who evaluated the patient in the ED and recommended admission to their service for surgical intervention.  Patient agreeable with plan.  Hemodynamically stable at time of admission.

## 2025-07-14 NOTE — H&P ADULT - HISTORY OF PRESENT ILLNESS
25 y.o. M w/ PMH of seizure who came in to the hospital w/ RLQ pain w/ abdominal pain, fever and nausea started with epigastric pain on Saturday and progressed to constant, RLQ pain w/ nausea	tactile fevers at home.  [id number] was used for conversation  25 y.o. M w/ PMH of seizure ( not on medication) who came in to the hospital w/ RLQ pain w/ abdominal pain, fever and nausea started with epigastric pain on Saturday and progressed to constant, RLQ pain w/ nausea. Pt was feeling feverish, took tylenol, which helped. This is the first time pt is experiencing such pain. Pt is not taking any medication, no prior surgical history.   [id number] was used for conversation    25 y.o. M w/ PMH of seizure ( not on medication) who came in to the hospital w/ RLQ pain w/ abdominal pain, fever and nausea started with epigastric pain on Saturday and progressed to constant, RLQ pain w/ nausea. Pt was feeling feverish, took tylenol, which helped. This is the first time pt is experiencing such pain. Pt is not taking any medication, no prior surgical history.

## 2025-07-14 NOTE — H&P ADULT - ATTENDING COMMENTS
Discussed with ED team.  Reviewed imaging and lab work independently.    25 M with RLQ abdominal pain for 2 day duration. CT imaging notable for appendicitis with some charbel-appendiceal inflammatory changes, appendicoliths. Surgery team consulted.    Visited pt in ED. Reports feeling terrible. Having RLQ abdominal pain. Endorses some nausea.     PE:  General; male, in chair, appears uncomfortable, in mild distress  Head; NC/AT  Heart; RRR  Lungs; even chest rise  Abdomen: soft, obese, TTP RLQ, no guarding, non-peritoneal    A/P:   25 M with acute appendicitis.   - Discussed admitting pt for IV antibiotics, dx laparoscopy, possible appendectomy  - Will add on for a dx laparoscopy, possible appendectomy  - NPO  - IVF hydration  - IV abx  - Lap appy packet given to pt in [place]ron[place]

## 2025-07-14 NOTE — BRIEF OPERATIVE NOTE - NSICDXBRIEFPOSTOP_GEN_ALL_CORE_FT
POST-OP DIAGNOSIS:  Acute gangrenous appendicitis with perforation and peritonitis 14-Jul-2025 22:58:07  Yamilka Victoria

## 2025-07-14 NOTE — ED PROVIDER NOTE - OBJECTIVE STATEMENT
24 yo man w/ no pmhx here w/ abdominal pain, fever and nausea  started with epigastric pain on Saturday and progressed to constant, RLQ pain w/ nausea  tactile fevers at home  no hx of similar in the past  no surgical hx  no allergies

## 2025-07-14 NOTE — PATIENT PROFILE ADULT - FALL HARM RISK - DEVICES
Problem: Pain  Goal: *Control of Pain  9/14/2021 1852 by Stephany Cochran LPN  Outcome: Progressing Towards Goal  9/14/2021 1852 by Stephany Cochran LPN  Outcome: Progressing Towards Goal     Problem: Patient Education: Go to Patient Education Activity  Goal: Patient/Family Education  9/14/2021 1852 by Stephany Cochran LPN  Outcome: Progressing Towards Goal  9/14/2021 1852 by Stephany Cochran LPN  Outcome: Progressing Towards Goal     Problem: Patient Education: Go to Patient Education Activity  Goal: Patient/Family Education  9/14/2021 1852 by Stephany Cochran LPN  Outcome: Progressing Towards Goal  9/14/2021 1852 by Stephany Cochran LPN  Outcome: Progressing Towards Goal     Problem: Vaginal Delivery: Day of Delivery-Post delivery  Goal: Activity/Safety  Outcome: Progressing Towards Goal  Goal: Consults, if ordered  Outcome: Progressing Towards Goal  Goal: Nutrition/Diet  Outcome: Progressing Towards Goal  Goal: Discharge Planning  Outcome: Progressing Towards Goal  Goal: Medications  Outcome: Progressing Towards Goal  Goal: Treatments/Interventions/Procedures  Outcome: Progressing Towards Goal  Goal: *Vital signs within defined limits  Outcome: Progressing Towards Goal  Goal: *Labs within defined limits  Outcome: Progressing Towards Goal  Goal: *Hemodynamically stable  Outcome: Progressing Towards Goal  Goal: *Optimal pain control at patient's stated goal  Outcome: Progressing Towards Goal  Goal: *Participates in infant care  Outcome: Progressing Towards Goal  Goal: *Demonstrates progressive activity  Outcome: Progressing Towards Goal  Goal: *Tolerating diet  Outcome: Progressing Towards Goal None

## 2025-07-14 NOTE — ED ADULT NURSE NOTE - NSFALLUNIVINTERV_ED_ALL_ED
Bed/Stretcher in lowest position, wheels locked, appropriate side rails in place/Call bell, personal items and telephone in reach/Instruct patient to call for assistance before getting out of bed/chair/stretcher/Non-slip footwear applied when patient is off stretcher/[place] to call system/Physically safe environment - no spills, clutter or unnecessary equipment/Purposeful proactive rounding/Room/bathroom lighting operational, light cord in reach

## 2025-07-14 NOTE — H&P ADULT - NSHPLABSRESULTS_GEN_ALL_CORE
LAB/STUDIES:                        16.3   16.71 )-----------( 342      ( 14 Jul 2025 06:30 )             47.9     07-14    136  |  100  |  18  ----------------------------<  122[H]  4.2   |  22  |  0.8    Ca    9.8      14 Jul 2025 06:30    TPro  7.7  /  Alb  4.4  /  TBili  1.0  /  DBili  x   /  AST  18  /  ALT  22  /  AlkPhos  137[H]  07-14      LIVER FUNCTIONS - ( 14 Jul 2025 06:30 )  Alb: 4.4 g/dL / Pro: 7.7 g/dL / ALK PHOS: 137 U/L / ALT: 22 U/L / AST: 18 U/L / GGT: x           Urinalysis Basic - ( 14 Jul 2025 08:30 )    Color: Yellow / Appearance: Clear / SG: >1.030 / pH: x  Gluc: x / Ketone: x  / Bili: Negative / Urobili: 1.0 mg/dL   Blood: x / Protein: 30 mg/dL / Nitrite: Negative   Leuk Esterase: Negative / RBC: 2 /HPF / WBC 1 /HPF   Sq Epi: x / Non Sq Epi: 0 /HPF / Bacteria: Negative /HPF    Urinalysis with Rflx Culture (collected 14 Jul 2025 08:30)      IMAGING:  < from: CT Abdomen and Pelvis w/ IV Cont (07.14.25 @ 06:41) >    1.  Acute appendicitis with appendicoliths. Given the degree of   inflammatory changes microperforation and/or extensive adhesions are   possibility. No organized collection or abscess.    < end of copied text >

## 2025-07-14 NOTE — H&P ADULT - NSHPPHYSICALEXAM_GEN_ALL_CORE
PHYSICAL EXAM:  General: NAD, AAOx3, calm and cooperative  HEENT: NCAT, EOMI, Trachea ML, Neck supple  Cardiac: RRR  Respiratory: On RA, saturating well, no labored breathing, normal respiratory effort  Abdomen: RLQ tenderness, abd soft, non-distended  Skin: Warm/dry, normal color, no jaundice

## 2025-07-15 ENCOUNTER — TRANSCRIPTION ENCOUNTER (OUTPATIENT)
Age: 25
End: 2025-07-15

## 2025-07-15 VITALS — HEART RATE: 80 BPM | SYSTOLIC BLOOD PRESSURE: 104 MMHG | DIASTOLIC BLOOD PRESSURE: 67 MMHG | RESPIRATION RATE: 18 BRPM

## 2025-07-15 LAB
MAGNESIUM SERPL-MCNC: 1.7 MG/DL — LOW (ref 1.8–2.4)
PHOSPHATE SERPL-MCNC: 4 MG/DL — SIGNIFICANT CHANGE UP (ref 2.1–4.9)

## 2025-07-15 PROCEDURE — 99024 POSTOP FOLLOW-UP VISIT: CPT

## 2025-07-15 RX ORDER — AMOXICILLIN AND CLAVULANATE POTASSIUM 500; 125 MG/1; MG/1
1 TABLET, FILM COATED ORAL
Qty: 14 | Refills: 0
Start: 2025-07-15 | End: 2025-07-21

## 2025-07-15 RX ORDER — LEVETIRACETAM 10 MG/ML
1 INJECTION, SOLUTION INTRAVENOUS
Qty: 28 | Refills: 0
Start: 2025-07-15 | End: 2025-07-28

## 2025-07-15 RX ORDER — MAGNESIUM SULFATE 500 MG/ML
2 SYRINGE (ML) INJECTION ONCE
Refills: 0 | Status: COMPLETED | OUTPATIENT
Start: 2025-07-15 | End: 2025-07-15

## 2025-07-15 RX ORDER — KETOROLAC TROMETHAMINE 30 MG/ML
15 INJECTION, SOLUTION INTRAMUSCULAR; INTRAVENOUS EVERY 6 HOURS
Refills: 0 | Status: DISCONTINUED | OUTPATIENT
Start: 2025-07-15 | End: 2025-07-15

## 2025-07-15 RX ADMIN — KETOROLAC TROMETHAMINE 15 MILLIGRAM(S): 30 INJECTION, SOLUTION INTRAMUSCULAR; INTRAVENOUS at 17:28

## 2025-07-15 RX ADMIN — Medication 25 GRAM(S): at 05:09

## 2025-07-15 RX ADMIN — Medication 650 MILLIGRAM(S): at 17:28

## 2025-07-15 RX ADMIN — Medication 25 GRAM(S): at 08:59

## 2025-07-15 RX ADMIN — Medication 650 MILLIGRAM(S): at 00:07

## 2025-07-15 RX ADMIN — Medication 1 APPLICATION(S): at 05:10

## 2025-07-15 RX ADMIN — KETOROLAC TROMETHAMINE 15 MILLIGRAM(S): 30 INJECTION, SOLUTION INTRAMUSCULAR; INTRAVENOUS at 09:01

## 2025-07-15 RX ADMIN — Medication 650 MILLIGRAM(S): at 11:08

## 2025-07-15 NOTE — DISCHARGE NOTE PROVIDER - CARE PROVIDER_API CALL
Carlos Franco  Surgical Critical Care  [address] [place] [address], [address]  [place] [address]  Phone: [phone]  Follow Up Time: 2 weeks

## 2025-07-15 NOTE — DISCHARGE NOTE PROVIDER - NSFOLLOWUPCLINICS_GEN_ALL_ED_FT
[facility]  Neurology  [address] [place] [address], [facility] [address]  [place] [address]  Phone: [phone]  Fax:   Scheduled Appointment: 7/17/2025

## 2025-07-15 NOTE — PROGRESS NOTE ADULT - ASSESSMENT
ASSESSMENT:  25y M s/p lap appy    PLAN:  -RD  -Conditional dc once tolerates diet    Lines/Tubes: PIV,

## 2025-07-15 NOTE — DISCHARGE NOTE PROVIDER - HOSPITAL COURSE
25 y.o. M w/ PMH of seizure ( not on medication) who came in to the hospital w/ RLQ pain w/ abdominal pain, fever and nausea started with epigastric pain on Saturday and progressed to constant, RLQ pain w/ nausea. Pt was feeling feverish, took tylenol, which helped. This is the first time pt is experiencing such pain. Pt is not taking any medication, no prior surgical history. On CT A/P imaging showed that patient had an Acute Appendicitis with appendicoliths. Patient was taken to the OR on 07/14 for Laparoscopic Appendectomy. Patient was given Keppra prophylaxtically during the procedure. He need to follow up with outpatient neurology. Patient was started on antibiotics in patients and he will be discharged home with 7 days of antibiotics. Patient is dis well post operatively,  diet was advance and he is tolerating diet. Patient is stable for discharge home.

## 2025-07-15 NOTE — PROGRESS NOTE ADULT - SUBJECTIVE AND OBJECTIVE BOX
GENERAL SURGERY PROGRESS NOTE    Patient: VALENCIA PADRON , 25y ([birth year])Male   MRN: [id number]  Location: [address]  Visit: 07-14-25 Inpatient  Date: 07-15-25 @ 09:18    Hospital Day #: 2  Post-Op Day #:1    Procedure/Dx/Injuries: acute appendicitis, s/p lap appy    Events of past 24 hours: The patient is now post op, doing well- pain is controlled. Will attempt PO diet.     PAST MEDICAL & SURGICAL HISTORY:  Seizure  No significant past surgical history    Vitals:   T(F): 97.7 (07-15-25 @ 07:58), Max: 101.1 (07-14-25 @ 16:02)  HR: 84 (07-15-25 @ 07:58)  BP: 128/78 (07-15-25 @ 07:58)  RR: 18 (07-15-25 @ 07:58)  SpO2: 98% (07-15-25 @ 07:58)      Diet, Regular      Fluids:     I & O's:    07-14-25 @ 07:01  -  07-15-25 @ 07:00  --------------------------------------------------------  IN:  Total IN: 0 mL    OUT:    Voided (mL): 400 mL  Total OUT: 400 mL    Total NET: -400 mL    PHYSICAL EXAM:  GENERAL: NAD, well-appearing  CHEST/LUNG: Equal chest rise bilaterally  HEART: Regular rate and rhythm  ABDOMEN: Soft, Nontender, Nondistended; incisions clean and dry.  EXTREMITIES:  No clubbing, cyanosis, or edema      MEDICATIONS  (STANDING):  acetaminophen     Tablet .. 650 milliGRAM(s) Oral every 6 hours  chlorhexidine 2% Cloths 1 Application(s) Topical <User Schedule>  enoxaparin Injectable 40 milliGRAM(s) SubCutaneous every 24 hours  piperacillin/tazobactam IVPB.. 3.375 Gram(s) IV Intermittent every 8 hours    MEDICATIONS  (PRN):  oxyCODONE    IR 5 milliGRAM(s) Oral every 6 hours PRN Severe Pain (7 - 10)      DVT PROPHYLAXIS: enoxaparin Injectable 40 milliGRAM(s) SubCutaneous every 24 hours    GI PROPHYLAXIS:   ANTICOAGULATION:   ANTIBIOTICS:  piperacillin/tazobactam IVPB.. 3.375 Gram(s)    LAB/STUDIES:  Labs:  CAPILLARY BLOOD GLUCOSE                      14.4   16.30 )-----------( 266      ( 14 Jul 2025 22:55 )             43.5       Auto Immature Granulocyte %: 0.6 % (07-14-25 @ 22:55)    07-14    138  |  104  |  14  ----------------------------<  130[H]  4.3   |  24  |  0.9    Calcium: 9.3 mg/dL (07-14-25 @ 22:55)    LFTs:             7.7  | 1.0  | 18       ------------------[137     ( 14 Jul 2025 06:30 )  4.4  | x    | 22          Urinalysis Basic - ( 14 Jul 2025 22:55 )    Color: x / Appearance: x / SG: x / pH: x  Gluc: 130 mg/dL / Ketone: x  / Bili: x / Urobili: x   Blood: x / Protein: x / Nitrite: x   Leuk Esterase: x / RBC: x / WBC x   Sq Epi: x / Non Sq Epi: x / Bacteria: x    Urinalysis with Rflx Culture (collected 14 Jul 2025 08:30)    IMAGING:  n/a    ACCESS/ DEVICES:  [x ] Peripheral IV

## 2025-07-15 NOTE — CHART NOTE - NSCHARTNOTEFT_GEN_A_CORE
POST-OP CHECK    PROCEDURE: S/P Laparoscopic Appendectomy     S:  ID [id number]. Pt awake and alert resting comfortably in bed. Pain controlled. Pt denies N/V, SOB, CP, palpitations.     O:   T(C): 36.7 (07-15-25 @ 00:07), Max: 37 (07-14-25 @ 22:18)  HR: 87 (07-15-25 @ 00:07) (79 - 92)  BP: 120/75 (07-15-25 @ 00:07) (101/57 - 120/75)  RR: 18 (07-15-25 @ 00:07) (15 - 20)  SpO2: 95% (07-15-25 @ 00:07) (95% - 97%)  I&O's Summary    Diet, Regular    Physical Examination:  General Appearance: NAD, resting comfortably in bed   HEENT: sclera non-icteric.  Lungs: even chest rise bilaterally  Abdomen:  Soft, tender in charbel-umbilical area, appropriate for post-op course, nondistended. No rigidity, guarding, or rebound tenderness.   MSK/Extremities: Warm & well-perfused. Peripheral pulses intact.  Skin: Warm, dry. No jaundice.   Incisions/Wounds: Dermabond intact, clean, dry and intact, no signs of infection/active bleeding/drainage      MEDICATIONS  (STANDING):  acetaminophen     Tablet .. 650 milliGRAM(s) Oral every 6 hours  chlorhexidine 2% Cloths 1 Application(s) Topical <User Schedule>  enoxaparin Injectable 40 milliGRAM(s) SubCutaneous every 24 hours  magnesium sulfate  IVPB 2 Gram(s) IV Intermittent once  piperacillin/tazobactam IVPB.. 3.375 Gram(s) IV Intermittent every 8 hours    MEDICATIONS  (PRN):  ketorolac   Injectable 15 milliGRAM(s) IV Push once PRN Moderate Pain (4 - 6)  oxyCODONE    IR 5 milliGRAM(s) Oral every 6 hours PRN Severe Pain (7 - 10)      LABS:                        14.4   16.30 )-----------( 266      ( 14 Jul 2025 22:55 )             43.5     07-14    138  |  104  |  14  ----------------------------<  130[H]  4.3   |  24  |  0.9    Ca    9.3      14 Jul 2025 22:55  Phos  4.0     07-14  Mg     1.7     07-14    TPro  7.7  /  Alb  4.4  /  TBili  1.0  /  DBili  x   /  AST  18  /  ALT  22  /  AlkPhos  137[H]  07-14    LIVER FUNCTIONS - ( 14 Jul 2025 06:30 )  Alb: 4.4 g/dL / Pro: 7.7 g/dL / ALK PHOS: 137 U/L / ALT: 22 U/L / AST: 18 U/L / GGT: x               ASSESSMENT/PLAN:   25y y/o  Male  s/p  Laparoscopic Appendectomy    Plan:  - Monitor vitals  - Monitor post-op labs and replete as necessary  - Monitor for bowel function  - Continue Pain Medications if necessary  - Continue Antibiotics if necessary  - Encourage ambulation as tolerated  - Monitor urine output and trial of void once Mendes removed  - DVT and GI Prophylaxis  - Monitor wound for changes, redress as needed.

## 2025-07-15 NOTE — DISCHARGE NOTE NURSING/CASE MANAGEMENT/SOCIAL WORK - PATIENT PORTAL LINK FT
You can access the FollowMyHealth Patient Portal offered by [facility] by registering at the following website: [email or web address]. By joining [facility]’s FollowMyHealth portal, you will also be able to view your health information using other applications (apps) compatible with our system.

## 2025-07-15 NOTE — DISCHARGE NOTE NURSING/CASE MANAGEMENT/SOCIAL WORK - FINANCIAL ASSISTANCE
[facility] provides services at a reduced cost to those who are determined to be eligible through [facility]’s financial assistance program. Information regarding [facility]’s financial assistance program can be found by going to [email or web address] or by calling [phone].

## 2025-07-15 NOTE — DISCHARGE NOTE PROVIDER - NSDCCPCAREPLAN_GEN_ALL_CORE_FT
PRINCIPAL DISCHARGE DIAGNOSIS  Diagnosis: Acute appendicitis  Assessment and Plan of Treatment:      PRINCIPAL DISCHARGE DIAGNOSIS  Diagnosis: Acute appendicitis  Assessment and Plan of Treatment: SURGERY DISCHARGE INSTRUCTIONS  FOLLOW-UP - with Dr. Franco in  2 weeks. Call the office to make an appointment or if you have any questions/concerns.  500mg of Keppra  twice a day  for two weeks was prescrided for siezure management. Please follow up with your Neurologist within a week of discharge.   DIET - regular.   ACTIVITY- No heavy lifting for 4-6 wks over 10-20 lbs. Walking is encouraged. No running or swimming. No driving while taking pain medication.  WOUNDCARE - Some drainage from your incisions or drain sites is normal. If you have drainage from an open incision or drain site- cover loosely with sterile gauze and tape and change daily.   If you have clear outer plastic dressing with gauze- remove 3 days after surgery and leave little white bandage strips underneath it on for 7 days. If you have no bandages but have purple glue over your incisions instead, this will come off with time. May shower 24 hours after surgery but no submerging wound under water for 2 weeks (tub bathing). Pat area dry when wet, keep clean and dry. Do not apply powders or lotion to wound area.   PAIN MEDS - . Take over the counter extra strength tylenol 650mg and/or ibuprofen 400mg with food every 6 hours for pain.        PRINCIPAL DISCHARGE DIAGNOSIS  Diagnosis: Acute appendicitis  Assessment and Plan of Treatment: SURGERY DISCHARGE INSTRUCTIONS  FOLLOW-UP - with Dr. Franco in  2 weeks. Call the office to make an appointment or if you have any questions/concerns.  500mg of Keppra  twice a day  for two weeks was prescrided for siezure management. Please follow up with Neurology on 07/17. It that date does not work for you call to make atn appointment at your earliest convience..   DIET - regular.   ACTIVITY- No heavy lifting for 4-6 wks over 10-20 lbs. Walking is encouraged. No running or swimming. No driving while taking pain medication.  WOUNDCARE - Some drainage from your incisions or drain sites is normal. If you have drainage from an open incision or drain site- cover loosely with sterile gauze and tape and change daily.   If you have clear outer plastic dressing with gauze- remove 3 days after surgery and leave little white bandage strips underneath it on for 7 days. If you have no bandages but have purple glue over your incisions instead, this will come off with time. May shower 24 hours after surgery but no submerging wound under water for 2 weeks (tub bathing). Pat area dry when wet, keep clean and dry. Do not apply powders or lotion to wound area.   PAIN MEDS - . Take over the counter extra strength tylenol 650mg and/or ibuprofen 400mg with food every 6 hours for pain.

## 2025-07-15 NOTE — DISCHARGE NOTE PROVIDER - NSDCMRMEDTOKEN_GEN_ALL_CORE_FT
amoxicillin-clavulanate 875 mg-125 mg oral tablet: 1 tab(s) orally 2 times a day   amoxicillin-clavulanate 875 mg-125 mg oral tablet: 1 tab(s) orally 2 times a day  Keppra 500 mg oral tablet: 1 tab(s) orally 2 times a day

## 2025-07-15 NOTE — PROGRESS NOTE ADULT - ATTENDING COMMENTS
Visited pt at bedside this AM during rounds. Reports having some abdominal pain, discomfort. Denies any nausea/vomiting. Denies any fever/chills. Denies any chest pain or SOB.     PE:  General; young male, in bed, tired appearing, comfortable  Head; NC/AT  Heart: RRR  Lungs; even chest rise  Abdomen: soft, non-distended, port-sites clean and dry, minimal tenderness on palpation    A/P:  25 M s/p laparoscopic appendectomy on 7/14/25 for perforated gangrenous appendicitis.   - D/C planning  - Diet  - Continue abx  - Will need outpatient antibiotics on D/C for 7 days  - Ambulation and out of bed  - Incentive spirometry Visited pt at bedside this AM during rounds. Reports having some abdominal pain, discomfort. Denies any nausea/vomiting. Denies any fever/chills. Denies any chest pain or SOB.     PE:  General; young male, in bed, tired appearing, comfortable  Head; NC/AT  Heart: RRR  Lungs; even chest rise  Abdomen: soft, non-distended, port-sites clean and dry, minimal tenderness on palpation    A/P:  25 M s/p laparoscopic appendectomy on 7/14/25 for perforated gangrenous appendicitis.   - D/C planning  - Diet  - Continue abx  - Will need outpatient antibiotics on D/C for 7 days  - Ambulation and out of bed  - Incentive spirometry  - Neurology follow up outpatient  - Keppra on d/c for hx of seizures

## 2025-07-23 DIAGNOSIS — G40.909 EPILEPSY, UNSPECIFIED, NOT INTRACTABLE, WITHOUT STATUS EPILEPTICUS: ICD-10-CM

## 2025-07-23 DIAGNOSIS — K35.80 UNSPECIFIED ACUTE APPENDICITIS: ICD-10-CM

## 2025-07-29 ENCOUNTER — APPOINTMENT (OUTPATIENT)
Dept: SURGERY | Facility: CLINIC | Age: 25
End: 2025-07-29
Payer: COMMERCIAL

## 2025-07-29 VITALS
HEART RATE: 70 BPM | BODY MASS INDEX: 27.49 KG/M2 | SYSTOLIC BLOOD PRESSURE: 116 MMHG | HEIGHT: 64 IN | OXYGEN SATURATION: 99 % | WEIGHT: 161 LBS | DIASTOLIC BLOOD PRESSURE: 74 MMHG

## 2025-07-29 PROCEDURE — 99024 POSTOP FOLLOW-UP VISIT: CPT
